# Patient Record
Sex: MALE | Race: WHITE | NOT HISPANIC OR LATINO | Employment: OTHER | URBAN - METROPOLITAN AREA
[De-identification: names, ages, dates, MRNs, and addresses within clinical notes are randomized per-mention and may not be internally consistent; named-entity substitution may affect disease eponyms.]

---

## 2018-08-17 RX ORDER — ACETAMINOPHEN 325 MG/1
650 TABLET ORAL EVERY 6 HOURS PRN
COMMUNITY

## 2018-08-17 RX ORDER — TAMSULOSIN HYDROCHLORIDE 0.4 MG/1
0.4 CAPSULE ORAL
COMMUNITY

## 2018-08-17 RX ORDER — ALPRAZOLAM 0.5 MG/1
TABLET ORAL
COMMUNITY

## 2018-08-17 RX ORDER — TRIAMCINOLONE ACETONIDE 55 UG/1
SPRAY, METERED NASAL DAILY
COMMUNITY
End: 2021-02-22 | Stop reason: HOSPADM

## 2018-08-17 RX ORDER — POTASSIUM CHLORIDE 20 MEQ/1
20 TABLET, EXTENDED RELEASE ORAL DAILY
COMMUNITY
End: 2021-02-22 | Stop reason: HOSPADM

## 2018-08-17 RX ORDER — MELATONIN
5000 DAILY
COMMUNITY

## 2018-08-17 RX ORDER — SERTRALINE HYDROCHLORIDE 25 MG/1
50 TABLET, FILM COATED ORAL DAILY
COMMUNITY

## 2018-08-17 RX ORDER — LATANOPROST 50 UG/ML
1 SOLUTION/ DROPS OPHTHALMIC
COMMUNITY
End: 2021-02-22 | Stop reason: HOSPADM

## 2018-08-17 RX ORDER — NITROGLYCERIN 0.4 MG/1
0.4 TABLET SUBLINGUAL AS NEEDED
COMMUNITY
End: 2021-02-22 | Stop reason: HOSPADM

## 2018-08-17 RX ORDER — FINASTERIDE 5 MG/1
5 TABLET, FILM COATED ORAL DAILY
COMMUNITY
End: 2021-02-22 | Stop reason: HOSPADM

## 2018-08-17 RX ORDER — PANTOPRAZOLE SODIUM 40 MG/1
40 TABLET, DELAYED RELEASE ORAL DAILY
COMMUNITY

## 2018-08-17 RX ORDER — SODIUM CHLORIDE 9 MG/ML
40 INJECTION, SOLUTION INTRAVENOUS CONTINUOUS
Status: CANCELLED | OUTPATIENT
Start: 2018-08-22 | End: 2018-08-23

## 2018-08-17 RX ORDER — LEVOTHYROXINE SODIUM 0.07 MG/1
75 TABLET ORAL DAILY
COMMUNITY

## 2018-08-17 RX ORDER — EPLERENONE 25 MG/1
25 TABLET, FILM COATED ORAL DAILY
COMMUNITY

## 2018-08-17 RX ORDER — METOPROLOL SUCCINATE 25 MG/1
25 TABLET, EXTENDED RELEASE ORAL
COMMUNITY

## 2018-08-17 RX ORDER — FUROSEMIDE 40 MG/1
40 TABLET ORAL DAILY
COMMUNITY

## 2018-08-17 RX ORDER — LIDOCAINE 50 MG/G
1 PATCH TOPICAL AS NEEDED
COMMUNITY

## 2018-08-17 RX ORDER — VALACYCLOVIR HYDROCHLORIDE 1 G/1
1000 TABLET, FILM COATED ORAL 3 TIMES DAILY
COMMUNITY
End: 2021-02-22 | Stop reason: HOSPADM

## 2018-08-17 RX ORDER — FERROUS SULFATE 325(65) MG
TABLET ORAL
COMMUNITY
End: 2018-08-31 | Stop reason: ALTCHOICE

## 2018-08-31 RX ORDER — ATORVASTATIN CALCIUM 10 MG/1
10 TABLET, FILM COATED ORAL
COMMUNITY

## 2018-08-31 RX ORDER — FERROUS SULFATE 325(65) MG
325 TABLET ORAL
COMMUNITY

## 2018-08-31 RX ORDER — CHLORAL HYDRATE 500 MG
1000 CAPSULE ORAL DAILY
COMMUNITY
End: 2021-02-22 | Stop reason: HOSPADM

## 2018-08-31 NOTE — PERIOPERATIVE NURSING NOTE
[facility]  Pt Christin bernabe is scheduling pt appointment  I notified [facility] of the  pt new resident, [employer] was contacted by [address] [place] [address] Ave PAT and [facility] about medications  Pt is on Relinquish and was  instruscted to hold 2 days prior to procedure date, per Dr Hernandez Rich

## 2018-09-05 ENCOUNTER — ANESTHESIA (OUTPATIENT)
Dept: PERIOP | Facility: HOSPITAL | Age: 83
End: 2018-09-05
Payer: COMMERCIAL

## 2018-09-05 ENCOUNTER — HOSPITAL ENCOUNTER (OUTPATIENT)
Facility: HOSPITAL | Age: 83
Setting detail: OUTPATIENT SURGERY
Discharge: HOME | End: 2018-09-05
Attending: INTERNAL MEDICINE | Admitting: INTERNAL MEDICINE
Payer: COMMERCIAL

## 2018-09-05 ENCOUNTER — ANESTHESIA EVENT (OUTPATIENT)
Dept: PERIOP | Facility: HOSPITAL | Age: 83
End: 2018-09-05
Payer: COMMERCIAL

## 2018-09-05 VITALS
TEMPERATURE: 98.8 F | HEART RATE: 68 BPM | HEIGHT: 67 IN | WEIGHT: 180 LBS | OXYGEN SATURATION: 100 % | RESPIRATION RATE: 20 BRPM | BODY MASS INDEX: 28.25 KG/M2 | SYSTOLIC BLOOD PRESSURE: 175 MMHG | DIASTOLIC BLOOD PRESSURE: 97 MMHG

## 2018-09-05 DIAGNOSIS — D50.0 IRON DEFICIENCY ANEMIA DUE TO CHRONIC BLOOD LOSS: ICD-10-CM

## 2018-09-05 PROCEDURE — 88305 TISSUE EXAM BY PATHOLOGIST: CPT | Performed by: PATHOLOGY

## 2018-09-05 RX ORDER — FENTANYL CITRATE/PF 50 MCG/ML
25 SYRINGE (ML) INJECTION
Status: DISCONTINUED | OUTPATIENT
Start: 2018-09-05 | End: 2018-09-05 | Stop reason: HOSPADM

## 2018-09-05 RX ORDER — SODIUM CHLORIDE 9 MG/ML
20 INJECTION, SOLUTION INTRAVENOUS CONTINUOUS
Status: DISCONTINUED | OUTPATIENT
Start: 2018-09-05 | End: 2018-09-05 | Stop reason: HOSPADM

## 2018-09-05 RX ORDER — METOCLOPRAMIDE HYDROCHLORIDE 5 MG/ML
10 INJECTION INTRAMUSCULAR; INTRAVENOUS ONCE AS NEEDED
Status: DISCONTINUED | OUTPATIENT
Start: 2018-09-05 | End: 2018-09-05 | Stop reason: HOSPADM

## 2018-09-05 RX ORDER — PROPOFOL 10 MG/ML
INJECTION, EMULSION INTRAVENOUS AS NEEDED
Status: DISCONTINUED | OUTPATIENT
Start: 2018-09-05 | End: 2018-09-05 | Stop reason: SURG

## 2018-09-05 RX ORDER — HYDRALAZINE HYDROCHLORIDE 20 MG/ML
5 INJECTION INTRAMUSCULAR; INTRAVENOUS AS NEEDED
Status: DISCONTINUED | OUTPATIENT
Start: 2018-09-05 | End: 2018-09-05 | Stop reason: HOSPADM

## 2018-09-05 RX ORDER — MEPERIDINE HYDROCHLORIDE 50 MG/ML
12.5 INJECTION INTRAMUSCULAR; INTRAVENOUS; SUBCUTANEOUS
Status: DISCONTINUED | OUTPATIENT
Start: 2018-09-05 | End: 2018-09-05 | Stop reason: HOSPADM

## 2018-09-05 RX ORDER — LABETALOL HYDROCHLORIDE 5 MG/ML
5 INJECTION, SOLUTION INTRAVENOUS AS NEEDED
Status: DISCONTINUED | OUTPATIENT
Start: 2018-09-05 | End: 2018-09-05 | Stop reason: HOSPADM

## 2018-09-05 RX ORDER — SODIUM CHLORIDE 9 MG/ML
125 INJECTION, SOLUTION INTRAVENOUS CONTINUOUS
Status: DISCONTINUED | OUTPATIENT
Start: 2018-09-05 | End: 2018-09-05 | Stop reason: HOSPADM

## 2018-09-05 RX ORDER — ONDANSETRON 2 MG/ML
4 INJECTION INTRAMUSCULAR; INTRAVENOUS ONCE AS NEEDED
Status: DISCONTINUED | OUTPATIENT
Start: 2018-09-05 | End: 2018-09-05 | Stop reason: HOSPADM

## 2018-09-05 RX ORDER — PROMETHAZINE HYDROCHLORIDE 25 MG/ML
6.25 INJECTION, SOLUTION INTRAMUSCULAR; INTRAVENOUS ONCE AS NEEDED
Status: DISCONTINUED | OUTPATIENT
Start: 2018-09-05 | End: 2018-09-05 | Stop reason: HOSPADM

## 2018-09-05 RX ADMIN — PROPOFOL 20 MG: 10 INJECTION, EMULSION INTRAVENOUS at 10:02

## 2018-09-05 RX ADMIN — PROPOFOL 50 MG: 10 INJECTION, EMULSION INTRAVENOUS at 09:54

## 2018-09-05 RX ADMIN — PROPOFOL 10 MG: 10 INJECTION, EMULSION INTRAVENOUS at 10:07

## 2018-09-05 RX ADMIN — PROPOFOL 10 MG: 10 INJECTION, EMULSION INTRAVENOUS at 10:12

## 2018-09-05 RX ADMIN — PROPOFOL 10 MG: 10 INJECTION, EMULSION INTRAVENOUS at 09:59

## 2018-09-05 RX ADMIN — SODIUM CHLORIDE: 0.9 INJECTION, SOLUTION INTRAVENOUS at 09:50

## 2018-09-05 NOTE — OP NOTE
**** GI/ENDOSCOPY REPORT ****     PATIENT NAME: Doris Mae ------ VISIT ID:  Patient ID:   [id number] YOB: 1927     INTRODUCTION: Colonoscopy - A 80 male patient presents for an outpatient   Colonoscopy at [facility]  PREVIOUS COLONOSCOPY: This colonoscopy is being performed for diagnostic   indication     INDICATIONS: Iron deficiency anemia  CONSENT:  The benefits, risks, and alternatives to the procedure were   discussed and informed consent was obtained from the patient  PREPARATION: EKG, pulse, pulse oximetry and blood pressure were monitored   throughout the procedure  The patient was identified by myself both   verbally and by visual inspection of ID band  Airway Assessment   Classification: Airway class 2 - Visualization of the soft palate, fauces   and uvula  ASA Classification: See anesthesia record  MEDICATIONS: Anesthesia-check records     PROCEDURE:  The endoscope was passed without difficulty through the anus   under direct visualization and advanced to the cecum, confirmed by   appendiceal orifice and ileocecal valve  The scope was withdrawn and the   mucosa was carefully examined  The quality of the preparation was  Cecal   Intubation Time: 3 minutes(s) Scope Withdrawal Time: 11 minutes(s)     RECTAL EXAM: Normal rectal exam      FINDINGS:  A single sessile polyp, measuring 8 mm in size, was found in   the proximal ascending colon  The polyp was removed by cold snare   polypectomy  The polyp was retrieved  A single sessile polyp, measuring 4   mm in size, was found in the rectum  There was evidence of moderately   severe diverticulosis in the sigmoid colon  Medium-sized grade III   hemorrhoids were found  COMPLICATIONS: There were no complications  IMPRESSIONS: A single sessile polyp found in the proximal ascending colon;   removed by cold snare polypectomy  A single sessile polyp found in the   rectum   Moderately severe diverticulosis found in the sigmoid colon  Grade   III hemorrhoids  RECOMMENDATIONS: Colonoscopy recommended in as needed per clinical   indication in the future  Resume Eliquis 9/7/18  Resume regular diet as   tolerated  Continue current medications  Follow-up appointment with   endoscopist in 1 month  ESTIMATED BLOOD LOSS:     PATHOLOGY SPECIMENS: Removed by cold snare polypectomy  Yes     PROCEDURE CODES: : Colonoscopy with snare polypectomy     ICD-9 Codes: 280 9 Iron deficiency anemia, unspecified 211 3 Benign   neoplasm of colon 211 4 Benign neoplasm of rectum and anal canal 562 10   Diverticulosis of colon (without mention of hemorrhage)     ICD-10 Codes: D50 9 Iron deficiency anemia, unspecified K63 5 Polyp of   colon K63 5 Polyp of colon K57 Diverticular disease of intestine K64 2   Third degree hemorrhoids     PERFORMED BY: GUY Jimenez  on 09/05/2018  The procedure was   performed by Dr Radha Nichols  Version 1, electronically signed by GUY Aguirre  on 09/05/2018   at 10:37

## 2018-09-05 NOTE — ANESTHESIA PREPROCEDURE EVALUATION
Review of Systems/Medical History  Patient summary reviewed  Chart reviewed  No history of anesthetic complications     Cardiovascular  Exercise tolerance (METS): <4,  Hyperlipidemia, Hypertension , Valvular heart disease , aortic valve stenosis, CAD , History of CABG, Dysrhythmias , atrial fibrillation,    Pulmonary  Smoker ex-smoker  ,        GI/Hepatic  Negative GI/hepatic ROS          Prostatic disorder, history of prostate cancer       Endo/Other  History of thyroid disease , hypothyroidism,      GYN  Negative gynecology ROS          Hematology  Anemia iron deficiency anemia,     Musculoskeletal    Arthritis     Neurology  Negative neurology ROS      Psychology   Negative psychology ROS              Physical Exam    Airway    Mallampati score: II  TM Distance: >3 FB  Neck ROM: full     Dental       Cardiovascular  Rhythm: regular, Rate: normal, Murmur, Cardiovascular exam normal    Pulmonary  Pulmonary exam normal Breath sounds clear to auscultation,     Other Findings        Anesthesia Plan  ASA Score- 3     Anesthesia Type- IV sedation with anesthesia with ASA Monitors  Additional Monitors:   Airway Plan:         Plan Factors-    Induction-     Postoperative Plan-     Informed Consent- Anesthetic plan and risks discussed with patient

## 2018-09-05 NOTE — OP NOTE
**** GI/ENDOSCOPY REPORT ****     PATIENT NAME: Mohan Mckenzie - VISIT ID:  Patient ID: [id number]   YOB: 1927     INTRODUCTION: Esophagogastroduodenoscopy - A 80 male patient presents for   an outpatient Esophag[facility]astroduodenoscopy at [facility]  INDICATIONS: Anemia  CONSENT: The benefits, risks, and alternatives to the procedure were   discussed and informed consent was obtained from the patient  PREPARATION:  EKG, pulse, pulse oximetry and blood pressure were monitored   throughout the procedure  MEDICATIONS:     PROCEDURE:  The endoscope was passed without difficulty through the mouth   under direct visualization and advanced to the 2nd portion of the   duodenum  The scope was withdrawn and the mucosa was carefully examined  FINDINGS:   Esophagus: Diverticulum of mid esophagus  Stomach: The   stomach appeared to be normal   Duodenum: The duodenum appeared to be   normal      COMPLICATIONS: There were no complications  IMPRESSIONS: Diverticulum of mid esophagus  Normal stomach  Normal   duodenum  RECOMMENDATIONS: No bleeding source on EGD  Proceed with colonoscopy  ESTIMATED BLOOD LOSS: None  PATHOLOGY SPECIMENS: No     PROCEDURE CODES: 43385 - EGD flexible; incl brushing or washing     ICD-9 Codes:     ICD-10 Codes: D64 9 Anemia, unspecified     PERFORMED BY: GUY Coles  on 09/05/2018  The procedure was   performed by Dr Miller Breeding  Version 1, electronically signed by GUY Singleton  on 09/05/2018   at 10:32

## 2018-09-05 NOTE — ANESTHESIA POSTPROCEDURE EVALUATION
Post-Op Assessment Note      CV Status:  Stable    Mental Status:  Alert and awake    Hydration Status:  Stable    PONV Controlled:  None    Airway Patency:  Patent and adequate    Post Op Vitals Reviewed: Yes          Staff: Anesthesiologist           BP      Temp     Pulse     Resp      SpO2

## 2020-01-24 ENCOUNTER — HOSPITAL ENCOUNTER (OUTPATIENT)
Dept: RADIOLOGY | Facility: HOSPITAL | Age: 85
Discharge: HOME | End: 2020-01-24
Attending: FAMILY MEDICINE
Payer: COMMERCIAL

## 2020-01-24 ENCOUNTER — TRANSCRIBE ORDERS (OUTPATIENT)
Dept: ADMINISTRATIVE | Facility: HOSPITAL | Age: 85
End: 2020-01-24

## 2020-01-24 DIAGNOSIS — S99.922A INJURY OF TOE ON LEFT FOOT, INITIAL ENCOUNTER: Primary | ICD-10-CM

## 2020-01-24 DIAGNOSIS — S99.922A INJURY OF TOE ON LEFT FOOT, INITIAL ENCOUNTER: ICD-10-CM

## 2020-01-24 PROCEDURE — 73630 X-RAY EXAM OF FOOT: CPT

## 2021-01-08 LAB — EXT SARS-COV-2: DETECTED

## 2021-01-18 ENCOUNTER — TELEPHONE (OUTPATIENT)
Dept: FAMILY MEDICINE CLINIC | Facility: HOSPITAL | Age: 86
End: 2021-01-18

## 2021-02-20 ENCOUNTER — APPOINTMENT (EMERGENCY)
Dept: CT IMAGING | Facility: HOSPITAL | Age: 86
End: 2021-02-20
Payer: COMMERCIAL

## 2021-02-20 ENCOUNTER — HOSPITAL ENCOUNTER (INPATIENT)
Facility: HOSPITAL | Age: 86
LOS: 2 days | Discharge: HOME HEALTH | DRG: 552 | End: 2021-02-22
Attending: SURGERY | Admitting: SURGERY
Payer: COMMERCIAL

## 2021-02-20 ENCOUNTER — HOSPITAL ENCOUNTER (EMERGENCY)
Facility: HOSPITAL | Age: 86
End: 2021-02-20
Attending: EMERGENCY MEDICINE | Admitting: EMERGENCY MEDICINE
Payer: COMMERCIAL

## 2021-02-20 ENCOUNTER — APPOINTMENT (EMERGENCY)
Dept: RADIOLOGY | Facility: HOSPITAL | Age: 86
End: 2021-02-20
Payer: COMMERCIAL

## 2021-02-20 VITALS
DIASTOLIC BLOOD PRESSURE: 63 MMHG | SYSTOLIC BLOOD PRESSURE: 108 MMHG | TEMPERATURE: 97.1 F | RESPIRATION RATE: 18 BRPM | HEIGHT: 67 IN | HEART RATE: 73 BPM | WEIGHT: 180 LBS | BODY MASS INDEX: 28.25 KG/M2 | OXYGEN SATURATION: 97 %

## 2021-02-20 DIAGNOSIS — S22.040A COMPRESSION FRACTURE OF T4 VERTEBRA (HCC): ICD-10-CM

## 2021-02-20 DIAGNOSIS — S22.029A CLOSED T2 FRACTURE (HCC): ICD-10-CM

## 2021-02-20 DIAGNOSIS — S09.90XA HEAD INJURY: ICD-10-CM

## 2021-02-20 DIAGNOSIS — S22.020A: ICD-10-CM

## 2021-02-20 DIAGNOSIS — S22.028A OTHER CLOSED FRACTURE OF SECOND THORACIC VERTEBRA, INITIAL ENCOUNTER (HCC): Primary | ICD-10-CM

## 2021-02-20 DIAGNOSIS — G96.08 SUBDURAL HYGROMA: ICD-10-CM

## 2021-02-20 DIAGNOSIS — S00.01XA ABRASION OF SCALP: ICD-10-CM

## 2021-02-20 DIAGNOSIS — S16.1XXA CERVICAL STRAIN, ACUTE: ICD-10-CM
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
## 2021-02-20 PROBLEM — M40.03 POSTURAL KYPHOSIS OF CERVICOTHORACIC REGION: Status: ACTIVE | Noted: 2021-02-20

## 2021-02-20 LAB
ANION GAP SERPL CALCULATED.3IONS-SCNC: 3 MMOL/L (ref 4–13)
ANION GAP SERPL CALCULATED.3IONS-SCNC: 4 MMOL/L (ref 4–13)
APTT PPP: 32 SECONDS (ref 23–37)
ATRIAL RATE: 89 BPM
BASOPHILS # BLD AUTO: 0.02 THOUSANDS/ΜL (ref 0–0.1)
BASOPHILS NFR BLD AUTO: 0 % (ref 0–1)
BUN SERPL-MCNC: 28 MG/DL (ref 5–25)
BUN SERPL-MCNC: 28 MG/DL (ref 5–25)
CALCIUM SERPL-MCNC: 8 MG/DL (ref 8.3–10.1)
CALCIUM SERPL-MCNC: 8.1 MG/DL (ref 8.3–10.1)
CHLORIDE SERPL-SCNC: 106 MMOL/L (ref 100–108)
CHLORIDE SERPL-SCNC: 107 MMOL/L (ref 100–108)
CO2 SERPL-SCNC: 32 MMOL/L (ref 21–32)
CO2 SERPL-SCNC: 34 MMOL/L (ref 21–32)
CREAT SERPL-MCNC: 1.24 MG/DL (ref 0.6–1.3)
CREAT SERPL-MCNC: 1.46 MG/DL (ref 0.6–1.3)
EOSINOPHIL # BLD AUTO: 0.03 THOUSAND/ΜL (ref 0–0.61)
EOSINOPHIL NFR BLD AUTO: 1 % (ref 0–6)
ERYTHROCYTE [DISTWIDTH] IN BLOOD BY AUTOMATED COUNT: 15.5 % (ref 11.6–15.1)
GFR SERPL CREATININE-BSD FRML MDRD: 41 ML/MIN/1.73SQ M
GFR SERPL CREATININE-BSD FRML MDRD: 50 ML/MIN/1.73SQ M
GLUCOSE SERPL-MCNC: 102 MG/DL (ref 65–140)
GLUCOSE SERPL-MCNC: 103 MG/DL (ref 65–140)
HCT VFR BLD AUTO: 37.7 % (ref 36.5–49.3)
HGB BLD-MCNC: 12.1 G/DL (ref 12–17)
IMM GRANULOCYTES # BLD AUTO: 0.01 THOUSAND/UL (ref 0–0.2)
IMM GRANULOCYTES NFR BLD AUTO: 0 % (ref 0–2)
INR PPP: 1.05 (ref 0.84–1.19)
LYMPHOCYTES # BLD AUTO: 1.09 THOUSANDS/ΜL (ref 0.6–4.47)
LYMPHOCYTES NFR BLD AUTO: 21 % (ref 14–44)
MAGNESIUM SERPL-MCNC: 2.3 MG/DL (ref 1.6–2.6)
MCH RBC QN AUTO: 34 PG (ref 26.8–34.3)
MCHC RBC AUTO-ENTMCNC: 32.1 G/DL (ref 31.4–37.4)
MCV RBC AUTO: 106 FL (ref 82–98)
MONOCYTES # BLD AUTO: 0.23 THOUSAND/ΜL (ref 0.17–1.22)
MONOCYTES NFR BLD AUTO: 5 % (ref 4–12)
NEUTROPHILS # BLD AUTO: 3.75 THOUSANDS/ΜL (ref 1.85–7.62)
NEUTS SEG NFR BLD AUTO: 73 % (ref 43–75)
NRBC BLD AUTO-RTO: 0 /100 WBCS
PHOSPHATE SERPL-MCNC: 3.3 MG/DL (ref 2.3–4.1)
PLATELET # BLD AUTO: 59 THOUSANDS/UL (ref 149–390)
PMV BLD AUTO: 11.4 FL (ref 8.9–12.7)
POTASSIUM SERPL-SCNC: 3.8 MMOL/L (ref 3.5–5.3)
POTASSIUM SERPL-SCNC: 4.7 MMOL/L (ref 3.5–5.3)
PROTHROMBIN TIME: 13.8 SECONDS (ref 11.6–14.5)
QRS AXIS: 34 DEGREES
QRSD INTERVAL: 90 MS
QT INTERVAL: 402 MS
QTC INTERVAL: 440 MS
RBC # BLD AUTO: 3.56 MILLION/UL (ref 3.88–5.62)
SODIUM SERPL-SCNC: 143 MMOL/L (ref 136–145)
SODIUM SERPL-SCNC: 143 MMOL/L (ref 136–145)
T WAVE AXIS: -24 DEGREES
TROPONIN I SERPL-MCNC: <0.02 NG/ML
VENTRICULAR RATE: 72 BPM
WBC # BLD AUTO: 5.13 THOUSAND/UL (ref 4.31–10.16)

## 2021-02-20 PROCEDURE — 80048 BASIC METABOLIC PNL TOTAL CA: CPT | Performed by: PHYSICIAN ASSISTANT

## 2021-02-20 PROCEDURE — 93005 ELECTROCARDIOGRAM TRACING: CPT

## 2021-02-20 PROCEDURE — 70450 CT HEAD/BRAIN W/O DYE: CPT

## 2021-02-20 PROCEDURE — 72125 CT NECK SPINE W/O DYE: CPT

## 2021-02-20 PROCEDURE — 99223 1ST HOSP IP/OBS HIGH 75: CPT | Performed by: SURGERY

## 2021-02-20 PROCEDURE — 85025 COMPLETE CBC W/AUTO DIFF WBC: CPT | Performed by: PHYSICIAN ASSISTANT

## 2021-02-20 PROCEDURE — 99223 1ST HOSP IP/OBS HIGH 75: CPT | Performed by: PHYSICIAN ASSISTANT

## 2021-02-20 PROCEDURE — 84100 ASSAY OF PHOSPHORUS: CPT | Performed by: SURGERY

## 2021-02-20 PROCEDURE — 84484 ASSAY OF TROPONIN QUANT: CPT | Performed by: PHYSICIAN ASSISTANT

## 2021-02-20 PROCEDURE — 71045 X-RAY EXAM CHEST 1 VIEW: CPT

## 2021-02-20 PROCEDURE — 36415 COLL VENOUS BLD VENIPUNCTURE: CPT | Performed by: PHYSICIAN ASSISTANT

## 2021-02-20 PROCEDURE — 83735 ASSAY OF MAGNESIUM: CPT | Performed by: SURGERY

## 2021-02-20 PROCEDURE — 85610 PROTHROMBIN TIME: CPT | Performed by: PHYSICIAN ASSISTANT

## 2021-02-20 PROCEDURE — 85730 THROMBOPLASTIN TIME PARTIAL: CPT | Performed by: PHYSICIAN ASSISTANT

## 2021-02-20 PROCEDURE — 93010 ELECTROCARDIOGRAM REPORT: CPT | Performed by: INTERNAL MEDICINE

## 2021-02-20 PROCEDURE — 99285 EMERGENCY DEPT VISIT HI MDM: CPT

## 2021-02-20 PROCEDURE — 80048 BASIC METABOLIC PNL TOTAL CA: CPT | Performed by: SURGERY

## 2021-02-20 RX ORDER — LEVOTHYROXINE SODIUM 0.07 MG/1
75 TABLET ORAL
Status: DISCONTINUED | OUTPATIENT
Start: 2021-02-21 | End: 2021-02-22 | Stop reason: HOSPADM

## 2021-02-20 RX ORDER — POTASSIUM CHLORIDE 20 MEQ/1
20 TABLET, EXTENDED RELEASE ORAL DAILY
Status: DISCONTINUED | OUTPATIENT
Start: 2021-02-21 | End: 2021-02-21

## 2021-02-20 RX ORDER — OXYCODONE HYDROCHLORIDE 5 MG/1
5 TABLET ORAL EVERY 4 HOURS PRN
Status: DISCONTINUED | OUTPATIENT
Start: 2021-02-20 | End: 2021-02-22 | Stop reason: HOSPADM

## 2021-02-20 RX ORDER — SODIUM CHLORIDE, SODIUM GLUCONATE, SODIUM ACETATE, POTASSIUM CHLORIDE, MAGNESIUM CHLORIDE, SODIUM PHOSPHATE, DIBASIC, AND POTASSIUM PHOSPHATE .53; .5; .37; .037; .03; .012; .00082 G/100ML; G/100ML; G/100ML; G/100ML; G/100ML; G/100ML; G/100ML
75 INJECTION, SOLUTION INTRAVENOUS CONTINUOUS
Status: DISCONTINUED | OUTPATIENT
Start: 2021-02-20 | End: 2021-02-21

## 2021-02-20 RX ORDER — ACETAMINOPHEN 325 MG/1
650 TABLET ORAL ONCE
Status: COMPLETED | OUTPATIENT
Start: 2021-02-20 | End: 2021-02-20

## 2021-02-20 RX ORDER — TAMSULOSIN HYDROCHLORIDE 0.4 MG/1
0.4 CAPSULE ORAL
Status: DISCONTINUED | OUTPATIENT
Start: 2021-02-20 | End: 2021-02-22 | Stop reason: HOSPADM

## 2021-02-20 RX ORDER — FINASTERIDE 5 MG/1
5 TABLET, FILM COATED ORAL DAILY
Status: DISCONTINUED | OUTPATIENT
Start: 2021-02-21 | End: 2021-02-21

## 2021-02-20 RX ORDER — GABAPENTIN 100 MG/1
100 CAPSULE ORAL 3 TIMES DAILY
Status: DISCONTINUED | OUTPATIENT
Start: 2021-02-20 | End: 2021-02-21

## 2021-02-20 RX ORDER — ACETAMINOPHEN 325 MG/1
650 TABLET ORAL EVERY 6 HOURS SCHEDULED
Status: DISCONTINUED | OUTPATIENT
Start: 2021-02-20 | End: 2021-02-21

## 2021-02-20 RX ORDER — METOPROLOL SUCCINATE 25 MG/1
25 TABLET, EXTENDED RELEASE ORAL
Status: DISCONTINUED | OUTPATIENT
Start: 2021-02-20 | End: 2021-02-22 | Stop reason: HOSPADM

## 2021-02-20 RX ORDER — FUROSEMIDE 40 MG/1
40 TABLET ORAL DAILY
Status: DISCONTINUED | OUTPATIENT
Start: 2021-02-21 | End: 2021-02-22 | Stop reason: HOSPADM

## 2021-02-20 RX ORDER — HYDROMORPHONE HCL/PF 1 MG/ML
0.2 SYRINGE (ML) INJECTION
Status: DISCONTINUED | OUTPATIENT
Start: 2021-02-20 | End: 2021-02-21

## 2021-02-20 RX ORDER — ONDANSETRON 2 MG/ML
4 INJECTION INTRAMUSCULAR; INTRAVENOUS EVERY 6 HOURS PRN
Status: DISCONTINUED | OUTPATIENT
Start: 2021-02-20 | End: 2021-02-21

## 2021-02-20 RX ORDER — OXYCODONE HYDROCHLORIDE 5 MG/1
2.5 TABLET ORAL EVERY 4 HOURS PRN
Status: DISCONTINUED | OUTPATIENT
Start: 2021-02-20 | End: 2021-02-22 | Stop reason: HOSPADM

## 2021-02-20 RX ORDER — ATORVASTATIN CALCIUM 10 MG/1
10 TABLET, FILM COATED ORAL
Status: DISCONTINUED | OUTPATIENT
Start: 2021-02-20 | End: 2021-02-22 | Stop reason: HOSPADM

## 2021-02-20 RX ORDER — ALPRAZOLAM 0.25 MG/1
0.25 TABLET ORAL
Status: DISCONTINUED | OUTPATIENT
Start: 2021-02-20 | End: 2021-02-21

## 2021-02-20 RX ORDER — LIDOCAINE 50 MG/G
2 PATCH TOPICAL DAILY
Status: DISCONTINUED | OUTPATIENT
Start: 2021-02-20 | End: 2021-02-22 | Stop reason: HOSPADM

## 2021-02-20 RX ORDER — PANTOPRAZOLE SODIUM 40 MG/1
40 TABLET, DELAYED RELEASE ORAL DAILY
Status: DISCONTINUED | OUTPATIENT
Start: 2021-02-21 | End: 2021-02-22 | Stop reason: HOSPADM

## 2021-02-20 RX ORDER — EPLERENONE 25 MG/1
25 TABLET, FILM COATED ORAL DAILY
Status: DISCONTINUED | OUTPATIENT
Start: 2021-02-21 | End: 2021-02-22 | Stop reason: HOSPADM

## 2021-02-20 RX ADMIN — TAMSULOSIN HYDROCHLORIDE 0.4 MG: 0.4 CAPSULE ORAL at 18:24

## 2021-02-20 RX ADMIN — GABAPENTIN 100 MG: 100 CAPSULE ORAL at 21:11

## 2021-02-20 RX ADMIN — ACETAMINOPHEN 650 MG: 325 TABLET ORAL at 18:24

## 2021-02-20 RX ADMIN — GABAPENTIN 100 MG: 100 CAPSULE ORAL at 18:24

## 2021-02-20 RX ADMIN — ATORVASTATIN CALCIUM 10 MG: 10 TABLET, FILM COATED ORAL at 21:11

## 2021-02-20 RX ADMIN — LIDOCAINE 2 PATCH: 50 PATCH TOPICAL at 18:24

## 2021-02-20 RX ADMIN — ACETAMINOPHEN 650 MG: 325 TABLET, FILM COATED ORAL at 13:20

## 2021-02-20 RX ADMIN — SODIUM CHLORIDE, SODIUM GLUCONATE, SODIUM ACETATE, POTASSIUM CHLORIDE, MAGNESIUM CHLORIDE, SODIUM PHOSPHATE, DIBASIC, AND POTASSIUM PHOSPHATE 75 ML/HR: .53; .5; .37; .037; .03; .012; .00082 INJECTION, SOLUTION INTRAVENOUS at 18:00

## 2021-02-20 NOTE — H&P
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  -Chronic T1& T3 vertebral fx  Trauma Plan:   Trauma surgery admission  DNR-DNI confirmed with pt's JHOANA Rey [facility]- [phone]  Neurosurgery consult  Place on tele for Afib  Hold eliquis  Resume home meds and geriatric consult to avoid polypharmacy  KAMRON: Cr: 1 4 up from baseline 1 1  started isolyte @ 75cc/h for 24h  PT/OT/ CM  Multimodal pain control    Chief Complaint: R sided neck pain  Back pain    History of Present Illness   HPI:  Corrie Hawkins is a 80 y o  male w PMH of CAD, Afib on eliquis, kyphosis, prior CABG, who presents after a fall from standing on 2/17  Fall was complicated with acute T2 fracture, also with change in mental status  Patient has chronic bilateral subdural hygromas left worse than the right  Also with an age indeterminate compression fracture T4 with central stenosis  Also chronic T1 and T3 vertebral fractures  Patient is sensory motor intact to his lower extremities  Noted T-spine tenderness on exam with significant kyphosis  GCS 14, confusion  Denied any recent fevers, chills, chest pain shortness of breath today  Mechanism:Fall    Review of Systems   Constitutional: Negative for chills and fever  HENT: Negative  Eyes: Negative  Respiratory: Negative  Cardiovascular: Negative  Gastrointestinal: Negative  Endocrine: Negative  Genitourinary: Negative  Musculoskeletal: Negative  Skin: Negative  Allergic/Immunologic: Negative  Neurological: Negative  Hematological: Negative  Psychiatric/Behavioral: Negative  12-point, complete review of systems was reviewed and negative except as stated above  Historical Information   History is unobtainable from the patient due to none  Efforts to obtain history included the following sources: family member    Past Medical History:   Diagnosis Date    Anemia     Arthritis     Atrial fibrillation ([facility] )     CAD (coronary artery disease)     Cancer ([facility] )     prostate    Diverticulosis     Kyphosis     UTI (urinary tract infection)      Past Surgical History:   Procedure Laterality Date    ADENOIDECTOMY      CORONARY ARTERY BYPASS GRAFT      INGUINAL HERNIA REPAIR      PA ESOPHAGOGASTRODUODENOSCOPY TRANSORAL DIAGNOSTIC N/A 9/5/2018    Procedure: EGD AND COLONOSCOPY;  Surgeon: Donivan Goldmann, DO;  Location: [facility] OR;  Service: Gastroenterology    TONSILLECTOMY      TRANSURETHRAL RESECTION OF PROSTATE       Social History   Social History     Substance and Sexual Activity   Alcohol Use None     Social History     Substance and Sexual Activity   Drug Use Not on file     Social History     Tobacco Use   Smoking Status Former Smoker   Smokeless Tobacco Never Used     E-Cigarette/Vaping     E-Cigarette/Vaping Substances       There is no immunization history on file for this patient  Last Tetanus: unknown  Family History: Non-contributory  Unable to obtain/limited by none  Meds/Allergies   all current active meds have been reviewed    Allergies   Allergen Reactions    Other Rash     Adhesive tape         PHYSICAL EXAM    PE limited by: none  Objective   Vitals:   First set: Temperature: (!) 97 2 °F (36 2 °C) (02/20/21 1515)  Pulse: 78 (02/20/21 1515)  Respirations: 18 (02/20/21 1515)  Blood Pressure: 129/71 (02/20/21 1515)    Primary Survey:   (A) Airway: intact  (B) Breathing: intact     (C) Circulation: Pulses:   normal  (D) Disabliity:  GCS Total:  15  (E) Expose:  Completed    Secondary Survey: (Click on Physical Exam tab above)  Physical Exam  Vitals signs reviewed  HENT:      Head: Normocephalic and atraumatic  Nose: Nose normal       Mouth/Throat:      Mouth: Mucous membranes are moist    Eyes:      General: No scleral icterus  Pupils: Pupils are equal, round, and reactive to light  Neck:      Musculoskeletal: Normal range of motion and neck supple  Cardiovascular:      Rate and Rhythm: Normal rate  Pulses: Normal pulses  Pulmonary:      Effort: Pulmonary effort is normal    Abdominal:      General: There is no distension  Palpations: Abdomen is soft  Tenderness: There is no abdominal tenderness  Genitourinary:     Comments: deferred  Musculoskeletal: Normal range of motion  Skin:     General: Skin is warm  Capillary Refill: Capillary refill takes 2 to 3 seconds  Neurological:      General: No focal deficit present  Mental Status: He is alert  Psychiatric:         Mood and Affect: Mood normal          Invasive Devices     Peripheral Intravenous Line            Peripheral IV 02/20/21 Left Antecubital less than 1 day                Lab Results:   Results: I have personally reviewed pertinent reports   , BMP/CMP:   Lab Results   Component Value Date    SODIUM 143 02/20/2021    K 3 8 02/20/2021     02/20/2021    CO2 34 (H) 02/20/2021    BUN 28 (H) 02/20/2021    CREATININE 1 46 (H) 02/20/2021    CALCIUM 8 0 (L) 02/20/2021    EGFR 41 02/20/2021   , CBC:   Lab Results   Component Value Date    WBC 5 13 02/20/2021    HGB 12 1 02/20/2021    HCT 37 7 02/20/2021     (H) 02/20/2021    PLT 59 (L) 02/20/2021    MCH 34 0 02/20/2021    MCHC 32 1 02/20/2021    RDW 15 5 (H) 02/20/2021    MPV 11 4 02/20/2021    NRBC 0 02/20/2021    and Coagulation:   Lab Results   Component Value Date    INR 1 05 02/20/2021     Imaging/EKG Studies: Results: I have personally reviewed pertinent reports      Other Studies:   2/20 CTH: Limited examination due to patient positioning  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  No acute intracranial abnormality  Chronic appearing bilateral subdural hygromas, left side larger than right   2/20 CTcspine: Somewhat limited examination due to patient positioning and body habitus  Severe, age-indeterminate compression fracture involving both the superior and inferior endplates of T4 with near vertebral plana  There is mild bony retropulsion, resulting in mild central stenosis  Mild compression fracture superior endplate T2 which appears acute  Mild chronic appearing compression fractures involving the superior endplates of T1 and T3 vertebral bodies  Moderate degenerative changes of the cervical spine  No cervical spine fracture or traumatic malalignment  2/20 CXR: Right midlung linear atelectasis      Code Status: No Order  Advance Directive and Living Will:      Power of :    POLST:

## 2021-02-20 NOTE — ED NOTES
Pt has Pureed diet and thickened liquids ordered at [employer], pt was given Tylenol in pudding and able to swallow without coughing        Mikala Hogan RN  02/20/21 5084
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
## 2021-02-20 NOTE — QUICK NOTE
Trauma  Quick note    Confirmed pt's dnr-dni status with family  Explained pt's care plan  Pt's POA is Reji Kilpatrick  Cell: [phone]       Bridgette Rebolledo MD  4:05PM  2/20/21

## 2021-02-20 NOTE — TRAUMA DOCUMENTATION
ROM p/u 1345, [facility]; Dr Sanders accepting; [phone] for RN to RN report

## 2021-02-21 ENCOUNTER — APPOINTMENT (INPATIENT)
Dept: RADIOLOGY | Facility: HOSPITAL | Age: 86
DRG: 552 | End: 2021-02-21
Payer: COMMERCIAL

## 2021-02-21 PROBLEM — S31.000A SACRAL WOUND: Status: ACTIVE | Noted: 2021-02-21

## 2021-02-21 PROBLEM — N17.9 ACUTE KIDNEY INJURY (HCC): Status: ACTIVE | Noted: 2021-02-21

## 2021-02-21 PROBLEM — R13.10 DYSPHAGIA: Status: ACTIVE | Noted: 2021-02-21

## 2021-02-21 PROBLEM — D64.9 ANEMIA: Status: ACTIVE | Noted: 2021-02-21
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
## 2021-02-21 PROBLEM — I48.91 ATRIAL FIBRILLATION (HCC): Status: ACTIVE | Noted: 2021-02-21

## 2021-02-21 LAB
ANION GAP SERPL CALCULATED.3IONS-SCNC: 6 MMOL/L (ref 4–13)
BUN SERPL-MCNC: 29 MG/DL (ref 5–25)
CALCIUM SERPL-MCNC: 8.3 MG/DL (ref 8.3–10.1)
CHLORIDE SERPL-SCNC: 108 MMOL/L (ref 100–108)
CO2 SERPL-SCNC: 32 MMOL/L (ref 21–32)
CREAT SERPL-MCNC: 1.18 MG/DL (ref 0.6–1.3)
ERYTHROCYTE [DISTWIDTH] IN BLOOD BY AUTOMATED COUNT: 15.6 % (ref 11.6–15.1)
GFR SERPL CREATININE-BSD FRML MDRD: 53 ML/MIN/1.73SQ M
GLUCOSE SERPL-MCNC: 131 MG/DL (ref 65–140)
GLUCOSE SERPL-MCNC: 85 MG/DL (ref 65–140)
HCT VFR BLD AUTO: 33.8 % (ref 36.5–49.3)
HGB BLD-MCNC: 10.7 G/DL (ref 12–17)
INR PPP: 1 (ref 0.84–1.19)
MCH RBC QN AUTO: 33.6 PG (ref 26.8–34.3)
MCHC RBC AUTO-ENTMCNC: 31.7 G/DL (ref 31.4–37.4)
MCV RBC AUTO: 106 FL (ref 82–98)
PLATELET # BLD AUTO: 53 THOUSANDS/UL (ref 149–390)
PMV BLD AUTO: 11 FL (ref 8.9–12.7)
POTASSIUM SERPL-SCNC: 3.9 MMOL/L (ref 3.5–5.3)
PROTHROMBIN TIME: 13.2 SECONDS (ref 11.6–14.5)
RBC # BLD AUTO: 3.18 MILLION/UL (ref 3.88–5.62)
SODIUM SERPL-SCNC: 146 MMOL/L (ref 136–145)
WBC # BLD AUTO: 3.69 THOUSAND/UL (ref 4.31–10.16)

## 2021-02-21 PROCEDURE — NC001 PR NO CHARGE: Performed by: SURGERY

## 2021-02-21 PROCEDURE — 99223 1ST HOSP IP/OBS HIGH 75: CPT | Performed by: NURSE PRACTITIONER

## 2021-02-21 PROCEDURE — 85610 PROTHROMBIN TIME: CPT | Performed by: SURGERY

## 2021-02-21 PROCEDURE — 72072 X-RAY EXAM THORAC SPINE 3VWS: CPT

## 2021-02-21 PROCEDURE — 99232 SBSQ HOSP IP/OBS MODERATE 35: CPT | Performed by: SURGERY

## 2021-02-21 PROCEDURE — 85027 COMPLETE CBC AUTOMATED: CPT | Performed by: SURGERY

## 2021-02-21 PROCEDURE — 97167 OT EVAL HIGH COMPLEX 60 MIN: CPT

## 2021-02-21 PROCEDURE — 92610 EVALUATE SWALLOWING FUNCTION: CPT

## 2021-02-21 PROCEDURE — 82948 REAGENT STRIP/BLOOD GLUCOSE: CPT

## 2021-02-21 PROCEDURE — 97163 PT EVAL HIGH COMPLEX 45 MIN: CPT

## 2021-02-21 PROCEDURE — 80048 BASIC METABOLIC PNL TOTAL CA: CPT | Performed by: PHYSICIAN ASSISTANT

## 2021-02-21 RX ORDER — SERTRALINE HYDROCHLORIDE 25 MG/1
25 TABLET, FILM COATED ORAL DAILY
Status: DISCONTINUED | OUTPATIENT
Start: 2021-02-22 | End: 2021-02-22 | Stop reason: HOSPADM

## 2021-02-21 RX ORDER — HEPARIN SODIUM 5000 [USP'U]/ML
5000 INJECTION, SOLUTION INTRAVENOUS; SUBCUTANEOUS EVERY 8 HOURS SCHEDULED
Status: DISCONTINUED | OUTPATIENT
Start: 2021-02-21 | End: 2021-02-22 | Stop reason: HOSPADM

## 2021-02-21 RX ORDER — FERROUS SULFATE 325(65) MG
325 TABLET ORAL
Status: DISCONTINUED | OUTPATIENT
Start: 2021-02-22 | End: 2021-02-22 | Stop reason: HOSPADM

## 2021-02-21 RX ORDER — GABAPENTIN 100 MG/1
100 CAPSULE ORAL
Status: DISCONTINUED | OUTPATIENT
Start: 2021-02-21 | End: 2021-02-22 | Stop reason: HOSPADM

## 2021-02-21 RX ORDER — MELATONIN
1000 DAILY
Status: DISCONTINUED | OUTPATIENT
Start: 2021-02-21 | End: 2021-02-22 | Stop reason: HOSPADM

## 2021-02-21 RX ORDER — ALPRAZOLAM 0.5 MG/1
0.5 TABLET ORAL
Status: DISCONTINUED | OUTPATIENT
Start: 2021-02-21 | End: 2021-02-22 | Stop reason: HOSPADM

## 2021-02-21 RX ORDER — POTASSIUM CHLORIDE 20MEQ/15ML
30 LIQUID (ML) ORAL ONCE
Status: COMPLETED | OUTPATIENT
Start: 2021-02-21 | End: 2021-02-21

## 2021-02-21 RX ORDER — ACETAMINOPHEN 325 MG/1
975 TABLET ORAL EVERY 8 HOURS SCHEDULED
Status: DISCONTINUED | OUTPATIENT
Start: 2021-02-21 | End: 2021-02-22 | Stop reason: HOSPADM

## 2021-02-21 RX ADMIN — POTASSIUM CHLORIDE 30 MEQ: 20 SOLUTION ORAL at 12:57

## 2021-02-21 RX ADMIN — LEVOTHYROXINE SODIUM 75 MCG: 75 TABLET ORAL at 06:36

## 2021-02-21 RX ADMIN — OXYCODONE HYDROCHLORIDE 5 MG: 5 TABLET ORAL at 21:14

## 2021-02-21 RX ADMIN — ACETAMINOPHEN 975 MG: 325 TABLET ORAL at 21:13

## 2021-02-21 RX ADMIN — FINASTERIDE 5 MG: 5 TABLET, FILM COATED ORAL at 09:50

## 2021-02-21 RX ADMIN — LIDOCAINE 2 PATCH: 50 PATCH TOPICAL at 17:35

## 2021-02-21 RX ADMIN — ACETAMINOPHEN 975 MG: 325 TABLET ORAL at 13:38

## 2021-02-21 RX ADMIN — ACETAMINOPHEN 650 MG: 325 TABLET ORAL at 00:00

## 2021-02-21 RX ADMIN — HEPARIN SODIUM 5000 UNITS: 5000 INJECTION INTRAVENOUS; SUBCUTANEOUS at 21:14

## 2021-02-21 RX ADMIN — Medication 1000 UNITS: at 12:56

## 2021-02-21 RX ADMIN — TAMSULOSIN HYDROCHLORIDE 0.4 MG: 0.4 CAPSULE ORAL at 17:34

## 2021-02-21 RX ADMIN — GABAPENTIN 100 MG: 100 CAPSULE ORAL at 09:51

## 2021-02-21 RX ADMIN — OXYCODONE HYDROCHLORIDE 5 MG: 5 TABLET ORAL at 09:55

## 2021-02-21 RX ADMIN — OXYCODONE HYDROCHLORIDE 5 MG: 5 TABLET ORAL at 17:34

## 2021-02-21 RX ADMIN — HEPARIN SODIUM 5000 UNITS: 5000 INJECTION INTRAVENOUS; SUBCUTANEOUS at 13:38

## 2021-02-21 RX ADMIN — GABAPENTIN 100 MG: 100 CAPSULE ORAL at 21:13

## 2021-02-21 RX ADMIN — POTASSIUM CHLORIDE 20 MEQ: 1500 TABLET, EXTENDED RELEASE ORAL at 09:50

## 2021-02-21 RX ADMIN — OXYCODONE HYDROCHLORIDE 5 MG: 5 TABLET ORAL at 01:39

## 2021-02-21 RX ADMIN — ATORVASTATIN CALCIUM 10 MG: 10 TABLET, FILM COATED ORAL at 21:13

## 2021-02-21 RX ADMIN — SERTRALINE HYDROCHLORIDE 50 MG: 50 TABLET ORAL at 09:51

## 2021-02-21 RX ADMIN — PANTOPRAZOLE SODIUM 40 MG: 40 TABLET, DELAYED RELEASE ORAL at 09:50

## 2021-02-21 RX ADMIN — ACETAMINOPHEN 650 MG: 325 TABLET ORAL at 06:36

## 2021-02-21 NOTE — ASSESSMENT & PLAN NOTE
Acute T2 SEP compression fracture, chronic fractures of T1, T3, T4   · S/p fall at home on 2/17, presented to hospital as trauma on 2/20  On Eliquis for Afib  · Severe kyphosis at baseline  · Ambulates at home  · GCS 14 (confusion, very [place])  Moving all extremities  No weakness  Complains of right shoulder pain  Imaging:  · CT cervical spine 2/20/2021: Somewhat limited examination due to patient positioning and body habitus  2   Severe, age-indeterminate compression fracture involving both the superior and inferior endplates of T4 with near vertebral plana  There is mild bony retropulsion, resulting in mild central stenosis  2   Mild compression fracture superior endplate T2 which appears acute  3   Mild chronic appearing compression fractures involving the superior endplates of T1 and T3 vertebral bodies  4   Moderate degenerative changes of the cervical spine  No cervical spine fracture or traumatic malalignment  Plan:  · Frequent neuro checks  · Unlikely to tolerate any bracing  · Obtain upright x-rays for comparison  · Mobilize with PT/OT  · DVT ppx: SCDs, [facility]  Neurosurgery will continue to follow  Please call with any questions or concerns

## 2021-02-21 NOTE — PLAN OF CARE
Problem: PHYSICAL THERAPY ADULT  Goal: Performs mobility at highest level of function for planned discharge setting  See evaluation for individualized goals  Description: Treatment/Interventions: Functional transfer training, LE strengthening/ROM, Elevations, Therapeutic exercise, Endurance training, Bed mobility, Gait training, Spoke to nursing, OT          See flowsheet documentation for full assessment, interventions and recommendations  Note: Prognosis: Guarded  Problem List: Decreased strength, Decreased range of motion, Decreased endurance, Impaired balance, Decreased mobility, Decreased cognition, Pain  Assessment: Pt is a 80 y o  male seen for PT evaluation s/p admit to [facility] on 2/20/2021  Pt was admitted with a primary dx of: closed T2 fracture s/p fall  No neurosurgical intervention or bracing required at this time  PT now consulted for assessment of mobility and d/c needs  Pt with Up in chair, active PT eval orders  Pts current comorbidities effecting treatment include: h/o Anemia, Arthritis, Atrial fibrillation ([facility] [address] ), CAD (coronary artery disease), Cancer ([facility] [address] ), Diverticulosis, Kyphosis, and UTI (urinary tract infection)  Pts current clinical presentation is Unstable/ Unpredictable (high complexity) due to Ongoing medical management for primary dx, Increased reliance on more restrictive AD compared to baseline, Decreased activity tolerance compared to baseline, Fall risk, Increased assistance needed from caregiver at current time, Ongoing telemetry monitoring, Cog status  Pt poor historian, unable to obtain PLOF or home setup  Per EMR, pt resides at [facility]; will clarify further with CM  Upon evaluation, pt currently is requiring modAx2 for bed mobility and modAx2 for transfers with [facility]  Pt only able to tolerate standing <10s with extremely kyphotic posture   Pt presents at PT eval functioning below baseline and currently w/ overall mobility deficits 2* to: BLE weakness, impaired balance, decreased endurance, pain, decreased activity tolerance compared to baseline, decreased functional mobility tolerance compared to baseline, fall risk, decreased cognition  Pt currently at a fall risk 2* to impairments listed above  Pt will continue to benefit from skilled acute PT interventions to address stated impairments; to maximize functional mobility; for ongoing pt/ family training; and DME needs  At conclusion of PT session pt returned BTB, bed alarm engaged and RN notified of session findings/recommendations with phone and call bell within reach  Pt denies any further questions at this time  The patient's AM-PAC Basic Mobility Inpatient Short Form Raw Score is 10, Standardized Score is 25 72   A standardized score less than 42 9 suggests the patient may benefit from discharge to post-acute rehabilitation services  Please also refer to the recommendation of the Physical Therapist for safe discharge planning  Recommend return to facility with increased A pending clarification of PLOF upon hospital D/C  PT Discharge Recommendation: Return to previous environment with social support(return to facility with increased assist pending PLOF)          See flowsheet documentation for full assessment

## 2021-02-21 NOTE — ASSESSMENT & PLAN NOTE
-hemoglobin drifted from 12 1-10 7  -may be related to dilution as patient has no signs of bleeding clinically  Has a dominant oral exam is benign and he is nontender  Chest x-ray was negative on admission    -repeat H and H in a m   -this is asymptomatic at this time

## 2021-02-21 NOTE — ORTHOTIC NOTE
Orthotic Note            Date: 2/21/2021      Patient Name: Puma Martinez            Reason for Consult:  Patient Active Problem List   Diagnosis    Postural kyphosis of cervicothoracic region    Closed T2 fracture ([address] )    Subdural hygroma   [employer] TLSO     [employer] attempted to fit pt with [employer]  Per neurosurgery, brace is not advised at this time due to kyphosis  RN aware and will continue to follow up as needed  Recommendations:  Please call [employer] ext [phone] in regards to any bracing instructions and/or adjustments       [address] [facility] Restorative Technician, BS

## 2021-02-21 NOTE — PLAN OF CARE
Problem: Potential for Falls  Goal: Patient will remain free of falls  Description: INTERVENTIONS:  - Assess patient frequently for physical needs  -  Identify cognitive and physical deficits and behaviors that affect risk of falls    -  [place] fall precautions as indicated by assessment   - Educate patient/family on patient safety including physical limitations  - Instruct patient to call for assistance with activity based on assessment  - Modify environment to reduce risk of injury  - Consider OT/PT consult to assist with strengthening/mobility  Outcome: Progressing     Problem: Prexisting or High Potential for Compromised Skin Integrity  Goal: Skin integrity is maintained or improved  Description: INTERVENTIONS:  - Identify patients at risk for skin breakdown  - Assess and monitor skin integrity  - Assess and monitor nutrition and hydration status  - Monitor labs   - Assess for incontinence   - Turn and reposition patient  - Assist with mobility/ambulation  - Relieve pressure over bony prominences  - Avoid friction and shearing  - Provide appropriate hygiene as needed including keeping skin clean and dry  - Evaluate need for skin moisturizer/barrier cream  - Collaborate with interdisciplinary team   - Patient/family teaching  - Consider wound care consult   Outcome: Progressing     Problem: PAIN - ADULT  Goal: Verbalizes/displays adequate comfort level or baseline comfort level  Description: Interventions:  - Encourage patient to monitor pain and request assistance  - Assess pain using appropriate pain scale  - Administer analgesics based on type and severity of pain and evaluate response  - Implement non-pharmacological measures as appropriate and evaluate response  - Consider cultural and social influences on pain and pain management  - Notify physician/advanced practitioner if interventions unsuccessful or patient reports new pain  Outcome: Progressing     Problem: INFECTION - ADULT  Goal: Absence or prevention of progression during hospitalization  Description: INTERVENTIONS:  - Assess and monitor for signs and symptoms of infection  - Monitor lab/diagnostic results  - Monitor all insertion sites, i e  indwelling lines, tubes, and drains  - Monitor endotracheal if appropriate and nasal secretions for changes in amount and color  - [place] appropriate cooling/warming therapies per order  - Administer medications as ordered  - Instruct and encourage patient and family to use good hand hygiene technique  - Identify and instruct in appropriate isolation precautions for identified infection/condition  Outcome: Progressing     Problem: SAFETY ADULT  Goal: Patient will remain free of falls  Description: INTERVENTIONS:  - Assess patient frequently for physical needs  -  Identify cognitive and physical deficits and behaviors that affect risk of falls    -  [place] fall precautions as indicated by assessment   - Educate patient/family on patient safety including physical limitations  - Instruct patient to call for assistance with activity based on assessment  - Modify environment to reduce risk of injury  - Consider OT/PT consult to assist with strengthening/mobility  Outcome: Progressing  Goal: Maintain or return to baseline ADL function  Description: INTERVENTIONS:  -  Assess patient's ability to carry out ADLs; assess patient's baseline for ADL function and identify physical deficits which impact ability to perform ADLs (bathing, care of mouth/teeth, toileting, grooming, dressing, etc )  - Assess/evaluate cause of self-care deficits   - Assess range of motion  - Assess patient's mobility; develop plan if impaired  - Assess patient's need for assistive devices and provide as appropriate  - Encourage maximum independence but intervene and supervise when necessary  - Involve family in performance of ADLs  - Assess for home care needs following discharge   - Consider OT consult to assist with ADL evaluation and planning for discharge  - Provide patient education as appropriate  Outcome: Progressing  Goal: Maintain or return mobility status to optimal level  Description: INTERVENTIONS:  - Assess patient's baseline mobility status (ambulation, transfers, stairs, etc )    - Identify cognitive and physical deficits and behaviors that affect mobility  - Identify mobility aids required to assist with transfers and/or ambulation (gait belt, sit-to-stand, lift, walker, cane, etc )  - [place] fall precautions as indicated by assessment  - Record patient progress and toleration of activity level on Mobility SBAR; progress patient to next Phase/Stage  - Instruct patient to call for assistance with activity based on assessment  - Consider rehabilitation consult to assist with strengthening/weightbearing, etc   Outcome: Progressing     Problem: DISCHARGE PLANNING  Goal: Discharge to home or other facility with appropriate resources  Description: INTERVENTIONS:  - Identify barriers to discharge w/patient and caregiver  - Arrange for needed discharge resources and transportation as appropriate  - Identify discharge learning needs (meds, wound care, etc )  - Arrange for interpretive services to assist at discharge as needed  - Refer to Case Management Department for coordinating discharge planning if the patient needs post-hospital services based on physician/advanced practitioner order or complex needs related to functional status, cognitive ability, or social support system  Outcome: Progressing     Problem: Knowledge Deficit  Goal: Patient/family/caregiver demonstrates understanding of disease process, treatment plan, medications, and discharge instructions  Description: Complete learning assessment and assess knowledge base    Interventions:  - Provide teaching at level of understanding  - Provide teaching via preferred learning methods  Outcome: Progressing

## 2021-02-21 NOTE — PROGRESS NOTES
Progress Note - Niyah Render 6/30/1927, 80 y o  male MRN: [id number]    Unit/Bed#: [facility] 623-01 Encounter: [id number]    Primary Care Provider: Verónica Jimenez MD   Date and time admitted to hospital: 2/20/2021  3:06 PM        [address]  -sustaining the below stated injuries  -patient has been falling more frequently over the last month, had a recent fall with rib fractures and was admitted to [facility] and discharged to rehab following admission  -PT and OT evaluated the patient recommend discharge back to assisted living facility with increased support which they can provide  -geriatrics evaluation is pending    * Closed T2 fracture [facility])  Assessment & Plan  -Compression fx s/p fall  -appreciate neurosurgery evaluation and recommendations  No plans for bracing secondary to severe kyphosis  -follow-up upright thoraco lumbar spine x-rays  -patient is neuro intact  -PT and OT evaluated the patient recommending discharge back to assisted living with increased support  I spoke with Kimberli Castro where he resides who confirm he has required increased support with all mobility and feeds since he was discharged from only rehab about a week ago after an admission to [facility] for rib fractures  Postural kyphosis of cervicothoracic region  Assessment & Plan  -chronic    Subdural hygroma  Assessment & Plan  -Chronic, present on arrival  -appreciate neurosurgery recommendations, no follow-up needed  -okay to start subcutaneous heparin for DVT prophylaxis    Acute kidney injury [facility])  Assessment & Plan  -creatinine 1 4 on admission  Baseline creatinine is 1 1    Patient has returned to baseline this morning with IV fluid hydration   -this is likely related to reduced oral intake secondary to dysphagia  -Hep-Lock IV fluids  -continue to encourage oral intake    Anemia  Assessment & Plan  -hemoglobin drifted from 12 1-10 7  -may be related to dilution as patient has no signs of bleeding clinically  Has a dominant oral exam is benign and he is nontender  Chest x-ray was negative on admission  -repeat H and H in a m   -this is asymptomatic at this time    Sacral wound  Assessment & Plan  -present on admission  -wound care is following    Dysphagia  Assessment & Plan  -patient is on pureed diet with nectar thick liquids for the last week at his assisted living facility  -will ask speech therapy to evaluate to confirm that this is the least restrictive diet possible for him    Atrial fibrillation [facility])  Assessment & Plan  -rate is controlled on home metoprolol  -home Eliquis is held  DVT prophylaxis started today with subcutaneous heparin   -I spoke with the patient's grandson, Abel Tuttle regarding risks and benefits of anticoagulation given the patient's age and fall risk in the setting of his recent decline  Day but verbalizes understanding at this point time agrees with continuing to hold until he talks further with his brother who is the patient's POA as well as the patient's cardiologist and family doctor  I explained will continue to hold until his hemoglobin is stable, rechecking in the morning and that I will touch base with them by phone tomorrow on 02/22/2021  TERTIARY TRAUMA SURVEY NOTE    Prophylaxis: Sequential compression device (Venodyne)  and Heparin    Disposition:  Continue med surge status, discharge back to assisted living facility likely on 02/22/2021    Code status:  Level 3 - DNAR and DNI    Consultants:  Neurosurgery, geriatrics    Is the patient 65 years or older?: YES:    1  Before the illness or injury that brought you to the Emergency, did you need someone to help you on a regular basis? 1=Yes   2  Since the illness or injury that brought you to the Emergency, have you needed more help than usual to take care of yourself? 1=Yes   3   Have you been hospitalized for one or more nights during the past 6 months (excluding a stay in the Emergency Department)? 1=Yes   4  In general, do you see well? 0= no   5  In general, do you have serious problems with your memory? 1=Yes   6  Do you take more than three different medications everyday? 1=Yes   TOTAL   6     Did you order a geriatric consult if the score was 2 or greater?: yes          SUBJECTIVE:     Transfer from:  [facility]  Outside Films Received: yes  Tertiary Exam Due on:  02/21/2021    Mechanism of Injury:Fall    Chief Complaint:  My back hurts    HPI/Last 24 hour events:  Patient notes discomfort in his back  He denies pain elsewhere  He states he did not hit his head when he fell  He has no new complaints at this time      Active medications:           Current Facility-Administered Medications:     acetaminophen (TYLENOL) tablet 975 mg, 975 mg, Oral, Q8H MIKEY    ALPRAZolam (XANAX) tablet 0 5 mg, 0 5 mg, Oral, HS PRN    atorvastatin (LIPITOR) tablet 10 mg, 10 mg, Oral, HS, 10 mg at 02/20/21 2111    cholecalciferol (VITAMIN D3) tablet 1,000 Units, 1,000 Units, Oral, Daily    eplerenone (INSPRA) tablet 25 mg, 25 mg, Oral, Daily    [START ON 2/22/2021] ferrous sulfate tablet 325 mg, 325 mg, Oral, Daily With Breakfast    furosemide (LASIX) tablet 40 mg, 40 mg, Oral, Daily    gabapentin (NEURONTIN) capsule 100 mg, 100 mg, Oral, HS    heparin (porcine) subcutaneous injection 5,000 Units, 5,000 Units, Subcutaneous, Q8H [facility]    levothyroxine tablet 75 mcg, 75 mcg, Oral, Early Morning, 75 mcg at 02/21/21 0636    lidocaine (LIDODERM) 5 % patch 2 patch, 2 patch, Topical, Daily, 2 patch at 02/20/21 1824    metoprolol succinate (TOPROL-XL) 24 hr tablet 25 mg, 25 mg, Oral, HS    oxyCODONE (ROXICODONE) IR tablet 2 5 mg, 2 5 mg, Oral, Q4H PRN    oxyCODONE (ROXICODONE) IR tablet 5 mg, 5 mg, Oral, Q4H PRN, 5 mg at 02/21/21 0955    pantoprazole (PROTONIX) EC tablet 40 mg, 40 mg, Oral, Daily, 40 mg at 02/21/21 0950    potassium chloride oral solution 30 mEq, 30 mEq, Oral, Once  [facility] ON 2/22/2021] sertraline (ZOLOFT) tablet 25 mg, 25 mg, Oral, Daily    tamsulosin (FLOMAX) capsule 0 4 mg, 0 4 mg, Oral, Daily With Dinner, 0 4 mg at 02/20/21 1824      OBJECTIVE:     Vitals:   Vitals:    02/21/21 0954   BP: 93/54   Pulse: 71   Resp: 16   Temp: 98 3 °F (36 8 °C)   SpO2:        Physical Exam:   GENERAL APPEARANCE:  No acute distress, resting comfortably  NEURO:  GCS 14 (4, 4, 6), nonfocal exam  HEENT:  Normocephalic, atraumatic  CV:  Regular rate and rhythm, no murmurs gallops or rubs  LUNGS:  Clear to auscultation bilaterally  GI:  Soft, nontender, nondistended  :  Voiding  MSK:  Moving all extremities equally with full strength  SKIN:  Pink, warm, dry    I/O:   I/O       02/19 0701 - 02/20 0700 02/20 0701 - 02/21 0700 02/21 0701 - 02/22 0700    I V  (mL/kg)  937 5 (18 8)     Total Intake(mL/kg)  937 5 (18 8)     Net  +937 5            Unmeasured Urine Occurrence  1 x           Invasive Devices: Invasive Devices     Peripheral Intravenous Line            Peripheral IV 02/20/21 Distal;Left;Upper;Ventral (anterior) Arm less than 1 day                  Imaging:   Xr Trauma Chest Portable    Result Date: 2/20/2021  Impression: Right midlung linear atelectasis  Workstation performed: [id number]     Trauma - Ct Head Wo Contrast    Result Date: 2/20/2021  Impression: 1  Limited examination due to patient positioning  2   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  No acute intracranial abnormality  3   Chronic appearing bilateral subdural hygromas, left side larger than right  The study was marked in [facility] for immediate notification  Workstation performed: [id number]     Trauma - Ct Spine Cervical Wo Contrast    Result Date: 2/20/2021  Impression: 1  Somewhat limited examination due to patient positioning and body habitus  2   Severe, age-indeterminate compression fracture involving both the superior and inferior endplates of T4 with near vertebral plana    There is mild bony retropulsion, resulting in mild central stenosis  2   Mild compression fracture superior endplate T2 which appears acute  3   Mild chronic appearing compression fractures involving the superior endplates of T1 and T3 vertebral bodies  4   Moderate degenerative changes of the cervical spine  No cervical spine fracture or traumatic malalignment  If warranted, consider follow-up MRI of the thoracic spine, for further evaluation    I personally discussed this study with Meryle Pointer on 2/20/2021 at 11:56 AM   Workstation performed: [id number]       Labs:   CBC:   Lab Results   Component Value Date    WBC 3 69 (L) 02/21/2021    HGB 10 7 (L) 02/21/2021    HCT 33 8 (L) 02/21/2021     (H) 02/21/2021    PLT 53 (L) 02/21/2021    MCH 33 6 02/21/2021    MCHC 31 7 02/21/2021    RDW 15 6 (H) 02/21/2021    MPV 11 0 02/21/2021     CMP:   Lab Results   Component Value Date     02/21/2021    CO2 32 02/21/2021    BUN 29 (H) 02/21/2021    CREATININE 1 18 02/21/2021    CALCIUM 8 3 02/21/2021    EGFR 53 02/21/2021

## 2021-02-21 NOTE — PLAN OF CARE
Summary:  Pt presents w/ mod oral and suspected at least mild/mod pharyngeal dysphagia  Pt unable to masticate and fully breakdown advanced solids  Oral manipulation is prolonged and disorganized  Lingual pumping used to form a cohesive bolus  Swallows w/ at least mild delay and reduced rise to palpation  Pt w/ wet vocal quality and audible gurgling inconsistently w/ NTL, strong cough response and gurgling w/ thin liquids  Pt w/ at least mild aspiration risk, recommend strict aspiration precautions, dysphagia diet modifications described below, and ST f/u        Recommendations:  Diet: Puree  Liquid: Honey thick   Meds: Crush in puree  Supervision: Full

## 2021-02-21 NOTE — ASSESSMENT & PLAN NOTE
-sustaining the below stated injuries  -patient has been falling more frequently over the last month, had a recent fall with rib fractures and was admitted to [facility] and discharged to rehab following admission    -PT and OT evaluated the patient recommend discharge back to assisted living facility with increased support which they can provide  -geriatrics evaluation is pending

## 2021-02-21 NOTE — ASSESSMENT & PLAN NOTE
-patient is on pureed diet with nectar thick liquids for the last week at his assisted living facility  -will ask speech therapy to evaluate to confirm that this is the least restrictive diet possible for him

## 2021-02-21 NOTE — SPEECH THERAPY NOTE
Speech Language/Pathology  Speech/Language Pathology  Assessment    Patient Name: Juany Meza  KKUZR'T Date: 2/21/2021     Problem List  Principal Problem:    Closed T2 fracture ([facility] [address] )  Active Problems:    Postural kyphosis of cervicothoracic region    Subdural hygroma    Fall    Acute kidney injury ([facility] [address] )    Sacral wound    Dysphagia    [facility]    Anemia    Past Medical History  Past Medical History:   Diagnosis Date    Anemia     Arthritis     [facility]     CAD (coronary artery disease)     Cancer ([facility] )     prostate    Diverticulosis     Kyphosis     UTI (urinary tract infection)      Past Surgical History  Past Surgical History:   Procedure Laterality Date    ADENOIDECTOMY      CORONARY ARTERY BYPASS GRAFT      INGUINAL HERNIA REPAIR      [place] ESOPHAGOGASTRODUODENOSCOPY TRANSORAL DIAGNOSTIC N/A 9/5/2018    Procedure: EGD AND COLONOSCOPY;  Surgeon: Clifton Moreno DO;  [facility] OR;  Service: Gastroenterology    TONSILLECTOMY      TRANSURETHRAL RESECTION OF PROSTATE          Bedside Swallow Evaluation:    Summary:  Pt presents w/ mod oral and suspected at least mild/mod pharyngeal dysphagia  Pt unable to masticate and fully breakdown advanced solids  Oral manipulation is prolonged and disorganized  Lingual pumping used to form a cohesive bolus  Swallows w/ at least mild delay and reduced rise to palpation  Pt w/ wet vocal quality and audible gurgling inconsistently w/ NTL, strong cough response and gurgling w/ thin liquids  Pt w/ at least mild aspiration risk, recommend strict aspiration precautions, dysphagia diet modifications described below, and ST f/u  Recommendations:  Diet: Puree  Liquid: Honey thick   Meds: Crush in puree  Supervision: Full   Positioning:Upright  Strategies: Pt to take PO/Meds only when fully alert and upright     Oral care: 3-4x daily   Aspiration precautions  Reflux precautions    Therapy Prognosis: Guarded-fair   Prognosis considerations: Age, current medical, past medical   Frequency: As able     Goal(s):  Pt will tolerate least restrictive diet w/out s/s aspiration or oral/pharyngeal difficulties  Patient's goal: "I'll go for the orange juice"     Consider consult w/:  -    HPI:  79 y/o male with PMhx of CAD, Afib on eliquis, kyphosis, prior CABG who presents to [facility] ED as a transfer from [address] where he initially presented after a fall  Pt had a fall from standing on 2/17 and found to have a T12 fx as well as change in mental status   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 and chronic T1 & T3 fx's  T-spine tenderness on exam with significant kyphosis  GCS 14, confusion  Admit inpatient to M/S unit under trauma service with Mechanical fall on 6/14 complicated with acute T2 fx and change in mental status GCS 14, Chronic B/l subdural hygromas, L>R, Age indeterminate compression fx T4 w central stenosis, Chronic T1& T3 vertebral fx   Telem monitoring  Neuro checks q4h  Hold eliquis  Resume home meds  Geriatrics consult to avoid polypharmacy  Cr: 1 4 up from baseline 1 1  started isolyte @ 75cc/h for 24h  PT/OT  Pain control  NSx consulted      NSx consult 2/21 -- neuro checks q4h   Unlikely to tolerate any bracing  Obtain upright x-rays for comparison  Mobilize with PT/OT  DVT ppx: SCDs, HSC      2/21 -- pt has been falling frequently over the past month and requiring more assistance at his assisted liviing facility since being hospitalized with covid infection [facility] month  Facility also states he has trouble swallowing and has been on a pureed diet with nectar thick liquids since he has been back   creatinine returned to baseline  D/c IVF's  Encourage po intake  Speech therapy to confirm diet  Continue to hold Eliquis   Ok to start Hep sc for DVT ppx      Reason for consult:  R/o aspiration  Determine safest and least restrictive diet  h/o dysphagia     Precautions:  Fall     Current diet:  Puree w/ NTL Premorbid diet[de-identified]  Puree w/ NTL     Previous VBS:  None on record    O2 requirement:  RA    Voice/Speech:  Strong, clear     Social:  [address]     Follows commands: Yes                         Cognitive Status:  Awake, alert    Oral [facility] exam:  Dentition: natural   Labial strength and ROM: wfl   Lingual strength and ROM: wfl   Mandibular strength and ROM: wfl   [place]um: unable to visualize   Secretion management: wfl   Volitional cough: weak   Volitional swallow: unable to elicit   Oral care: good condition     Items administered:  Puree, soft solid, hard solid, honey thick liquid, nectar thick liquid, thin liquids  Liquids were taken by straw/cup       Oral stage:  Lip closure: wfl   Mastication: prolonged for advanced solids, unable to breakdown toast   Bolus formation: prolonged   Bolus control: fair, suspect reduced w/ thin liquids   Transfer: wfl   Oral residue: no   Pocketing: no     Pharyngeal stage:  Swallow promptness: mild/mod delay   Laryngeal rise: reduced to palpation   Wet voice: w/ NTL   Throat clear: -   Cough: w/ thin liquids   Secondary swallows: w/ all   Audible swallows: w/ all     Esophageal stage:  No s/s reported  H/o GERD    Aspiration precautions posted    Results d/w:  Pt, nursing

## 2021-02-21 NOTE — OCCUPATIONAL THERAPY NOTE
Occupational Therapy Cancellation Note        Patient Name: Gely ALVAREZ Date: 2/21/2021 02/21/21 0800   OT Last Visit   OT Visit Date 02/21/21   Note Type   Note type Evaluation   Cancel Reasons Medical status       OT orders received, chart reviewed  Noted that pt is currently pending neurosurgery consult  OT will hold at this time and continue to follow and see as medically appropriate and able       FELIZ Willson, OTR/L

## 2021-02-21 NOTE — ASSESSMENT & PLAN NOTE
-Chronic, present on arrival  -appreciate neurosurgery recommendations, no follow-up needed  -okay to start subcutaneous heparin for DVT prophylaxis

## 2021-02-21 NOTE — ASSESSMENT & PLAN NOTE
-Compression fx s/p fall  -appreciate neurosurgery evaluation and recommendations  No plans for bracing secondary to severe kyphosis  -follow-up upright thoraco lumbar spine x-rays  -patient is neuro intact  -PT and OT evaluated the patient recommending discharge back to assisted living with increased support  I spoke with Danae Uzair where he resides who confirm he has required increased support with all mobility and feeds since he was discharged from only rehab about a week ago after an admission to [facility] for rib fractures

## 2021-02-21 NOTE — ASSESSMENT & PLAN NOTE
-creatinine 1 4 on admission  Baseline creatinine is 1 1    Patient has returned to baseline this morning with IV fluid hydration   -this is likely related to reduced oral intake secondary to dysphagia  -Hep-Lock IV fluids  -continue to encourage oral intake

## 2021-02-21 NOTE — OCCUPATIONAL THERAPY NOTE
Occupational Therapy Evaluation     Patient Name: Yue SILVA Date: 2/21/2021  Problem List  Principal Problem:    Closed T2 fracture ([facility] [address] )  Active Problems:    Postural kyphosis of cervicothoracic region    Subdural hygroma    Fall    Acute kidney injury ([facility] [address] )    Sacral wound    Dysphagia    Atrial fibrillation ([facility] [address] )    Anemia    Past Medical History  Past Medical History:   Diagnosis Date    Anemia     Arthritis     Atrial fibrillation ([facility] [address]     CAD (coronary artery disease)     Cancer ([facility] [address] )     prostate    Diverticulosis     Kyphosis     UTI (urinary tract infection)      Past Surgical History  Past Surgical History:   Procedure Laterality Date    ADENOIDECTOMY      CORONARY ARTERY BYPASS GRAFT      INGUINAL HERNIA REPAIR      AR ESOPHAGOGASTRODUODENOSCOPY TRANSORAL DIAGNOSTIC N/A 9/5/2018    Procedure: EGD AND COLONOSCOPY;  Surgeon: Kamille Thomas DO;  Location:  MAIN OR;  Service: Gastroenterology    TONSILLECTOMY      TRANSURETHRAL RESECTION OF PROSTATE             02/21/21 1024   OT Last Visit   OT Visit Date 02/21/21   Note Type   Note type Evaluation   Restrictions/Precautions   Braces or Orthoses   (no brace per neurosurgery)   Other Precautions Cognitive; Bed Alarm;Multiple lines;Telemetry; Fall Risk;Pain   Pain Assessment   Pain Assessment Tool FLACC   Pain Location/Orientation Location: Back   Pain Rating: FLACC (Rest) - Face 0   Pain Rating: FLACC (Rest) - Legs 0   Pain Rating: FLACC (Rest) - Activity 0   Pain Rating: FLACC (Rest) - Cry 0   Pain Rating: FLACC (Rest) - Consolability 0   Score: FLACC (Rest) 0   Pain Rating: FLACC (Activity) - Face 1   Pain Rating: FLACC (Activity) - Legs 0   Pain Rating: FLACC (Activity) - Activity 0   Pain Rating: FLACC (Activity) - Cry 1   Pain Rating: FLACC (Activity) - Consolability 1   Score: FLACC (Activity) 3   Home Living   Additional Comments Pt is a poor historian  Per EMR, pt lives at [address] [place] [address] Function   Level of [place] Needs assistance with IADLs; Needs assistance with ADLs and functional mobility   Lives With Facility staff   Receives Help From   (facility staff)   ADL Assistance Needs assistance   IADLs Needs assistance   Falls in the last 6 months   (unable to report)   Vocational Retired   Lifestyle   Autonomy Pt is a poor historian  Per EMR, pt was discharged from rehab 1 week ago and has required increased A with ADLS and mobility since then  Pt reports that he is able to walk at baseline  Reciprocal Relationships Pt receives assistance from facility staff   Service to Others Retired   [address] to report, will continue to assess   Subjective   Subjective "How am I supposed to know where I live"    ADL   Where Assessed Edge of bed   Eating Assistance 4  Minimal Assistance   Grooming Assistance 4  Minimal Assistance   UB Bathing Assistance 3  Moderate Assistance   LB Bathing Assistance 2  Maximal Assistance   UB Dressing Assistance 3  Moderate Assistance   LB Dressing Assistance 2  Maximal [address]  2  Maximal Assistance   Bed Mobility   Supine to Sit 3  Moderate assistance   Additional items Assist x 2; Increased time required;Verbal cues;LE management   Sit to Supine 3  Moderate assistance   Additional items Assist x 2; Increased time required;Verbal cues;LE management   Additional Comments Pt able to sit EOB with min A for trunk control  After OT session pt in bed with alarm on and all needs within reach  Transfers   Sit to Stand 3  Moderate assistance   Additional items Assist x 2; Increased time required;Verbal cues   Stand to Sit 3  Moderate assistance   Additional items Assist x 2; Increased time required;Verbal cues   Functional Mobility   Additional Comments Pt unable to take any steps this session     Balance   Static Sitting Poor +   Dynamic Sitting Poor +   Static Standing Poor   Dynamic Standing Poor   Activity Tolerance   Activity Tolerance Patient limited by fatigue   Medical Staff Made Aware PT Mary   Nurse Made Aware RN confirmed okay to see pt   Cognition   Overall Cognitive Status Impaired   Arousal/Participation Arousable   Attention Attends with cues to redirect   Orientation Level Oriented to place; Disoriented to place; Disoriented to time   Memory Decreased recall of precautions;Decreased recall of recent events   Following Commands Follows one step commands with increased time or repetition   Comments Pt requires some envouragement, but is overall pleasantly confused  Assessment   Limitation Decreased ADL status; Decreased endurance;Decreased cognition;Decreased self-care trans;Decreased high-level ADLs   Prognosis Fair   Assessment Pt is a 80 y o  male admitted to [facility] on [facility] w/ fall and closed T2 fracture  No neurosurgical intervention or brace at this time  Comorbidities include a h/o Anemia, Arthritis, Atrial fibrillation ([facility] [address] CAD (coronary artery disease), Cancer ([facility] [address] ), Diverticulosis, Kyphosis, and UTI (urinary tract infection)  Pt with active OT orders and up in chair orders  Pt is a poor historian  Per EMR, pt lives at [address] [place] [address]  Per EMR, pt was discharged from rehab 1 week ago and has required increased A with ADLS and mobility since then  Pt reports that he is able to walk at baseline  Currently pt is mod A x2 for functional transfers, mod A for UB ADLS and max A for LB ADLS  Pt is limited at this time 2*: pain, endurance, activity tolerance, functional mobility, forward functional reach, functional standing tolerance and decreased I w/ ADLS/IADLS  The following Occupational Performance Areas to address include: grooming, bathing/shower, toilet hygiene, dressing, functional mobility and clothing management  Based on the aforementioned OT evaluation, functional performance deficits, and assessments, pt has been identified as a high complexity evaluation   From OT standpoint, anticipate d/c return to facility with increased assistance pending clarification of PLOF and available support    Pt to continue to benefit from acute immediate OT services to address the following goals 2-3x/week to  w/in 10-14 days: [employer] Goals   Patient Goals To rest   LTG Time Frame 10-   Long Term Goal #1 See goals below   Plan   Treatment Interventions ADL retraining;Functional transfer training;UE strengthening/ROM; Endurance training;Cognitive reorientation;Equipment evaluation/education;Patient/family training; Compensatory technique education;Continued evaluation; Energy conservation; Activityengagement   Goal Expiration Date 21   OT Frequency 2-3x/wk   Recommendation   OT Discharge Recommendation Return to previous environment with social support  (return to facility with increased support pending clarification of PLOF and available support)   OT - OK to Discharge Yes  (When medically appropriate)   Modified [place] Scale   Modified [place] Scale 4     GOALS    1) Pt will increase activity tolerance to G for 30 min txment sessions    2) Pt will complete UB/LB dressing/self care w/ min A using adaptive device and DME as needed    3) Pt will complete bathing w/ min A w/ use of AE and DME as needed    4) Pt will complete toileting w/ min A w/ G hygiene/thoroughness using DME as needed    5) Pt will improve functional transfers to min A on/off all surfaces using DME as needed w/ G balance/safety     6) Pt will tolerate bed mobility and EOB seated tasks w/ min A for min 30 mins to engage in fx'l I/ADL/leisure tasks    7) Pt will demonstrate G carryover of pt/caregiver education and training as appropriate  8) Pt will independently identify and utilize 2-3 coping strategies to increase positive affect and promote overall well-being      9) Pt will engage in ongoing cognitive assessment w/ G participation to assist w/ safe d/c planning/recommendations (as needed)    *OTR to assess functional mobility as appropriate      Joan Gunn, MOT, OTR/L

## 2021-02-21 NOTE — PHYSICAL THERAPY NOTE
Physical Therapy Cancellation Note    Patient's Name: Rachel Estrella       02/21/21 6790   PT Last Visit   PT Visit Date 02/21/21   Note Type   Note type Evaluation   Cancel Reasons Medical status       Orders received, chart reviewed  Pt pending neurosurgery consult, will hold PT at this time  Will follow for PT evaluation/treatment as medically appropriate  Thank you       Eloisa Phan, PT, DPT

## 2021-02-21 NOTE — PLAN OF CARE
Problem: Potential for Falls  Goal: Patient will remain free of falls  Description: INTERVENTIONS:  - Assess patient frequently for physical needs  -  Identify cognitive and physical deficits and behaviors that affect risk of falls    -  [place] fall precautions as indicated by assessment   - Educate patient/family on patient safety including physical limitations  - Instruct patient to call for assistance with activity based on assessment  - Modify environment to reduce risk of injury  - Consider OT/PT consult to assist with strengthening/mobility  Outcome: Progressing     Problem: Prexisting or High Potential for Compromised Skin Integrity  Goal: Skin integrity is maintained or improved  Description: INTERVENTIONS:  - Identify patients at risk for skin breakdown  - Assess and monitor skin integrity  - Assess and monitor nutrition and hydration status  - Monitor labs   - Assess for incontinence   - Turn and reposition patient  - Assist with mobility/ambulation  - Relieve pressure over bony prominences  - Avoid friction and shearing  - Provide appropriate hygiene as needed including keeping skin clean and dry  - Evaluate need for skin moisturizer/barrier cream  - Collaborate with interdisciplinary team   - Patient/family teaching  - Consider wound care consult   Outcome: Progressing     Problem: PAIN - ADULT  Goal: Verbalizes/displays adequate comfort level or baseline comfort level  Description: Interventions:  - Encourage patient to monitor pain and request assistance  - Assess pain using appropriate pain scale  - Administer analgesics based on type and severity of pain and evaluate response  - Implement non-pharmacological measures as appropriate and evaluate response  - Consider cultural and social influences on pain and pain management  - Notify physician/advanced practitioner if interventions unsuccessful or patient reports new pain  Outcome: Progressing     Problem: INFECTION - ADULT  Goal: Absence or prevention of progression during hospitalization  Description: INTERVENTIONS:  - Assess and monitor for signs and symptoms of infection  - Monitor lab/diagnostic results  - Monitor all insertion sites, i e  indwelling lines, tubes, and drains  - Monitor endotracheal if appropriate and nasal secretions for changes in amount and color  - [place] appropriate cooling/warming therapies per order  - Administer medications as ordered  - Instruct and encourage patient and family to use good hand hygiene technique  - Identify and instruct in appropriate isolation precautions for identified infection/condition  Outcome: Progressing     Problem: SAFETY ADULT  Goal: Patient will remain free of falls  Description: INTERVENTIONS:  - Assess patient frequently for physical needs  -  Identify cognitive and physical deficits and behaviors that affect risk of falls    -  [place] fall precautions as indicated by assessment   - Educate patient/family on patient safety including physical limitations  - Instruct patient to call for assistance with activity based on assessment  - Modify environment to reduce risk of injury  - Consider OT/PT consult to assist with strengthening/mobility  Outcome: Progressing  Goal: Maintain or return to baseline ADL function  Description: INTERVENTIONS:  -  Assess patient's ability to carry out ADLs; assess patient's baseline for ADL function and identify physical deficits which impact ability to perform ADLs (bathing, care of mouth/teeth, toileting, grooming, dressing, etc )  - Assess/evaluate cause of self-care deficits   - Assess range of motion  - Assess patient's mobility; develop plan if impaired  - Assess patient's need for assistive devices and provide as appropriate  - Encourage maximum independence but intervene and supervise when necessary  - Involve family in performance of ADLs  - Assess for home care needs following discharge   - Consider OT consult to assist with ADL evaluation and planning for discharge  - Provide patient education as appropriate  Outcome: Progressing  Goal: Maintain or return mobility status to optimal level  Description: INTERVENTIONS:  - Assess patient's baseline mobility status (ambulation, transfers, stairs, etc )    - Identify cognitive and physical deficits and behaviors that affect mobility  - Identify mobility aids required to assist with transfers and/or ambulation (gait belt, sit-to-stand, lift, walker, cane, etc )  - [place] fall precautions as indicated by assessment  - Record patient progress and toleration of activity level on Mobility SBAR; progress patient to next Phase/Stage  - Instruct patient to call for assistance with activity based on assessment  - Consider rehabilitation consult to assist with strengthening/weightbearing, etc   Outcome: Progressing

## 2021-02-21 NOTE — CONSULTS
Consult- Iza Momin 6/30/1927, 80 y o  male MRN: [id number]    Unit/Bed#: [facility] 623-01 Encounter: [id number]    Primary Care Provider: Karen Thakkar MD   Date and time admitted to hospital: 2/20/2021  3:06 PM      Inpatient consult to Neurosurgery  Consult performed by: YURY Rowley  Consult ordered by: Lori Brown MD          Subdural hygroma  Assessment & Plan  S/p fall at home on Eliquis on 2/17  Risk versus benefit discussion regarding Eliquis in this 80year old patient with frequent falls  No repeat imaging needed  Imaging:  · CT head 2/21/2021: Limited examination due to patient positioning  2   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  No acute intracranial abnormality  3   Chronic appearing bilateral subdural hygromas, left side larger than right  * Closed T2 fracture (HCC)  Assessment & Plan  Acute T2 SEP compression fracture, chronic fractures of T1, T3, T4   · S/p fall at home on 2/17, presented to hospital as trauma on 2/20  On Eliquis for Afib  · Severe kyphosis at baseline  · Ambulates at home  · GCS 14 (confusion, very [place])  Moving all extremities  No weakness  Complains of right shoulder pain  Imaging:  · CT cervical spine 2/20/2021: Somewhat limited examination due to patient positioning and body habitus  2   Severe, age-indeterminate compression fracture involving both the superior and inferior endplates of T4 with near vertebral plana  There is mild bony retropulsion, resulting in mild central stenosis  2   Mild compression fracture superior endplate T2 which appears acute  3   Mild chronic appearing compression fractures involving the superior endplates of T1 and T3 vertebral bodies  4   Moderate degenerative changes of the cervical spine  No cervical spine fracture or traumatic malalignment  Plan:  · Frequent neuro checks  · Unlikely to tolerate any bracing  · Obtain upright x-rays for comparison    · Mobilize with PT/OT  · DVT ppx: SCDs, HSC  Neurosurgery will continue to follow  Please call with any questions or concerns  History of Present Illness     HPI: Saundra Kuhn is a 80 y o  male with PMH including CAD, Afib on Eliquis, CABG, who presented as a trauma evaluation after sustaining a fall on 2/17  On imaging he was noted with chronic bilateral hygromas as well as acute T2 fractures and age-indeterminate/chronic T1, T3, T4 fractures  He complains of right upper back/shoulder pain  He denies any numbness or weakness  Denies any weakness/ Denies BBI  Does not recall falling  Very hard of hearing  Review of Systems   Constitutional: Negative  Negative for activity change, appetite change and fatigue  HENT: Negative for ear pain, hearing loss, nosebleeds, postnasal drip, tinnitus, trouble swallowing and voice change  Eyes: Negative for pain and visual disturbance  Respiratory: Negative for chest tightness and shortness of breath  Cardiovascular: Negative for chest pain, palpitations and leg swelling  Gastrointestinal: Negative for abdominal pain, diarrhea, nausea and vomiting  Musculoskeletal: Positive for back pain, neck pain and neck stiffness  Skin: Negative for color change and pallor  Neurological: Negative for dizziness, tremors, seizures, syncope, facial asymmetry, speech difficulty, weakness, light-headedness, numbness and headaches  Psychiatric/Behavioral: Positive for confusion  Negative for agitation and behavioral problems         Historical Information   Past Medical History:   Diagnosis Date    Anemia     Arthritis     Atrial fibrillation ([address] )     CAD (coronary artery disease)     Cancer (HCC)     prostate    Diverticulosis     Kyphosis     UTI (urinary tract infection)      Past Surgical History:   Procedure Laterality Date    ADENOIDECTOMY      CORONARY ARTERY BYPASS GRAFT      INGUINAL HERNIA REPAIR      MT ESOPHAGOGASTRODUODENOSCOPY TRANSORAL DIAGNOSTIC N/A 9/5/2018    Procedure: EGD AND COLONOSCOPY;  Surgeon: Cheryl Fink DO;  Location: [facility];  Service: Gastroenterology    TONSILLECTOMY      TRANSURETHRAL RESECTION OF PROSTATE       Social History     Substance and Sexual Activity   Alcohol Use None     Social History     Substance and Sexual Activity   Drug Use Not on file     Social History     Tobacco Use   Smoking Status Former Smoker   Smokeless Tobacco Never Used     History reviewed  No pertinent family history      Meds/Allergies   all current active meds have been reviewed and current meds:   Current Facility-Administered Medications   Medication Dose Route Frequency    acetaminophen (TYLENOL) tablet 650 mg  650 mg Oral Q6H [address]    ALPRAZolam (XANAX) tablet 0 25 mg  0 25 mg Oral HS PRN    atorvastatin (LIPITOR) tablet 10 mg  10 mg Oral HS    eplerenone (INSPRA) tablet 25 mg  25 mg Oral Daily    finasteride (PROSCAR) tablet 5 mg  5 mg Oral Daily    furosemide (LASIX) tablet 40 mg  40 mg Oral Daily    gabapentin (NEURONTIN) capsule 100 mg  100 mg Oral TID    heparin (porcine) subcutaneous injection 5,000 Units  5,000 Units Subcutaneous Q8H [address]    levothyroxine tablet 75 mcg  75 mcg Oral Early Morning    lidocaine (LIDODERM) 5 % patch 2 patch  2 patch Topical Daily    metoprolol succinate (TOPROL-XL) 24 hr tablet 25 mg  25 mg Oral HS    multi-electrolyte (PLASMALYTE-A/ISOLYTE-S PH 7 4) IV solution  75 mL/hr Intravenous Continuous    ondansetron (ZOFRAN) injection 4 mg  4 mg Intravenous Q6H PRN    oxyCODONE (ROXICODONE) IR tablet 2 5 mg  2 5 mg Oral Q4H PRN    oxyCODONE (ROXICODONE) IR tablet 5 mg  5 mg Oral Q4H PRN    pantoprazole (PROTONIX) EC tablet 40 mg  40 mg Oral Daily    potassium chloride (K-DUR,KLOR-CON) CR tablet 20 mEq  20 mEq Oral Daily    sertraline (ZOLOFT) tablet 50 mg  50 mg Oral Daily    tamsulosin (FLOMAX) capsule 0 4 mg  0 4 mg Oral Daily With Dinner     Allergies   Allergen Reactions    Other Rash Adhesive tape       Objective   I/O       02/19 0701 - 02/20 0700 02/20 0701 - 02/21 0700 02/21 0701 - 02/22 0700    I V  (mL/kg)  937 5 (18 8)     Total Intake(mL/kg)  937 5 (18 8)     Net  +937 5            Unmeasured Urine Occurrence  1 x           Physical Exam  Constitutional:       General: He is not in acute distress  Appearance: He is well-developed  He is not diaphoretic  Comments: Frail   Eyes:      General:         Right eye: No discharge  Left eye: No discharge  Extraocular Movements: EOM normal       Conjunctiva/sclera: Conjunctivae normal       Pupils: Pupils are equal, round, and reactive to light  Neck:      Musculoskeletal: Normal range of motion and neck supple  Comments: Severe kyphosis  Pulmonary:      Effort: Pulmonary effort is normal  No respiratory distress  Abdominal:      General: Bowel sounds are normal  There is no distension  Palpations: Abdomen is soft  Tenderness: There is no abdominal tenderness  Musculoskeletal: Normal range of motion  Skin:     General: Skin is warm and dry  Neurological:      Mental Status: He is alert  Cranial Nerves: No cranial nerve deficit  Sensory: No sensory deficit  Motor: No weakness  Coordination: Coordination normal  Finger-Nose-Finger Test normal       Deep Tendon Reflexes: Reflexes normal       Reflex Scores:       Tricep reflexes are 1+ on the right side and 1+ on the left side  Bicep reflexes are 1+ on the right side and 1+ on the left side  Brachioradialis reflexes are 1+ on the right side and 1+ on the left side  Patellar reflexes are 1+ on the right side and 1+ on the left side  Achilles reflexes are 1+ on the right side and 1+ on the left side  Psychiatric:         Speech: Speech normal          Behavior: Behavior normal          Thought Content:  Thought content normal          Judgment: Judgment normal        Neurologic Exam     Mental Status   Oriented to person  Disoriented to place  Oriented to time  Disoriented to year, month and date  Registration: recalls 3 of 3 objects  Attention: normal  Concentration: normal    Speech: speech is normal   Level of consciousness: alert  Able to name object  Cranial Nerves   Cranial nerves II through XII intact  CN III, IV, VI   Pupils are equal, round, and reactive to light  Extraocular motions are normal    Right pupil: Size: 3 mm  Shape: regular  Reactivity: brisk  Consensual response: intact  Accommodation: intact  Left pupil: Size: 3 mm  Shape: regular  Reactivity: brisk  Consensual response: intact  Accommodation: intact  Nystagmus: none   Diplopia: none  Conjugate gaze: present    CN V   Right facial sensation deficit: none  Left facial sensation deficit: none    CN VII   Facial expression full, symmetric       CN VIII   Hearing: intact    CN IX, X   Palate: symmetric    CN XI   Right sternocleidomastoid strength: normal  Left sternocleidomastoid strength: normal  Right trapezius strength: normal  Left trapezius strength: normal    CN XII   Tongue: not atrophic  Fasciculations: absent  Tongue deviation: none    Motor Exam   Muscle bulk: normal  Overall muscle tone: normal  Right arm pronator drift: absent  Left arm pronator drift: absent    Strength   Right deltoid: 5/5  Left deltoid: 5/5  Right biceps: 5/5  Left biceps: 5/5  Right triceps: 5/5  Left triceps: 5/5  Right quadriceps: 5/5  Left quadriceps: 5/5  Right hamstrin/5  Left hamstrin/5  Right anterior tibial: 5/5  Left anterior tibial: 5/5  Right posterior tibial: 5/5  Left posterior tibial: 5/5  Right peroneal: 5/5  Left peroneal: 5/5  Right gastroc: 5/5  Left gastroc: 5/5    Sensory Exam   Light touch normal    Proprioception normal      Gait, Coordination, and Reflexes     Coordination   Finger to nose coordination: normal    Tremor   Resting tremor: absent  Intention tremor: absent  Action tremor: absent    Reflexes   Right brachioradialis: 1+  Left brachioradialis: 1+  Right biceps: 1+  Left biceps: 1+  Right triceps: 1+  Left triceps: 1+  Right patellar: 1+  Left patellar: 1+  Right achilles: 1+  Left achilles: 1+  Right : 1+  Left : 1+      Vitals:Blood pressure 134/77, pulse 59, temperature 97 6 °F (36 4 °C), resp  rate 16, height 5' 6" (1 676 m), weight 49 9 kg (110 lb), SpO2 96 %  ,Body mass index is 17 75 kg/m²  Lab Results:   Results from last 7 days   Lab Units 02/21/21  0217 02/20/21  1114   WBC Thousand/uL 3 69* 5 13   HEMOGLOBIN g/dL 10 7* 12 1   HEMATOCRIT % 33 8* 37 7   PLATELETS Thousands/uL 53* 59*   NEUTROS PCT %  --  73   MONOS PCT %  --  5     Results from last 7 days   Lab Units 02/20/21  1854 02/20/21  1114   POTASSIUM mmol/L 4 7 3 8   CHLORIDE mmol/L 107 106   CO2 mmol/L 32 34*   BUN mg/dL 28* 28*   CREATININE mg/dL 1 24 1 46*   CALCIUM mg/dL 8 1* 8 0*     Results from last 7 days   Lab Units 02/20/21  1854   MAGNESIUM mg/dL 2 3     Results from last 7 days   Lab Units 02/20/21  1854   PHOSPHORUS mg/dL 3 3     Results from last 7 days   Lab Units 02/21/21  0137 02/20/21  1114   INR  1 00 1 05   PTT seconds  --  32     No results found for: TROPONINT  ABG:No results found for: PHART, WGH1EGU, PO2ART, IEF9VWG, L2RTILJA, BEART, SOURCE    Imaging Studies: I have personally reviewed pertinent reports  and I have personally reviewed pertinent films in PACS    Xr Trauma Chest Portable    Result Date: 2/20/2021  Impression: Right midlung linear atelectasis  Workstation performed: [id number]     Trauma - Ct Head Wo Contrast    Result Date: 2/20/2021  Impression: 1  Limited examination due to patient positioning  2   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  No acute intracranial abnormality  3   Chronic appearing bilateral subdural hygromas, left side larger than right  The study was marked in [facility] for immediate notification   Workstation performed: [id number]     Trauma - Ct Spine Cervical Wo Contrast    Result Date: 2/20/2021  Impression: 1  Somewhat limited examination due to patient positioning and body habitus  2   Severe, age-indeterminate compression fracture involving both the superior and inferior endplates of T4 with near vertebral plana  There is mild bony retropulsion, resulting in mild central stenosis  2   Mild compression fracture superior endplate T2 which appears acute  3   Mild chronic appearing compression fractures involving the superior endplates of T1 and T3 vertebral bodies  4   Moderate degenerative changes of the cervical spine  No cervical spine fracture or traumatic malalignment  If warranted, consider follow-up MRI of the thoracic spine, for further evaluation  I personally discussed this study with Kayli Jorgensen on 2/20/2021 at 11:56 AM   Workstation performed: [id number]       EKG, Pathology, and Other Studies: I have personally reviewed pertinent reports  and I have personally reviewed pertinent films in PACS    VTE Prophylaxis: Sequential compression device (Venodyne)  and Heparin    Code Status: Level 3 - DNAR and DNI  Advance Directive and Living Will:      Power of :    POLST:      Counseling / Coordination of Care  I spent 20 minutes with the patient

## 2021-02-21 NOTE — ASSESSMENT & PLAN NOTE
S/p fall at home on Eliquis on 2/17  Risk versus benefit discussion regarding Eliquis in this 80year old patient with frequent falls  No repeat imaging needed  Imaging:  · CT head 2/21/2021: Limited examination due to patient positioning  2   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  No acute intracranial abnormality  3   Chronic appearing bilateral subdural hygromas, left side larger than right

## 2021-02-21 NOTE — PLAN OF CARE
Problem: OCCUPATIONAL THERAPY ADULT  Goal: Performs self-care activities at highest level of function for planned discharge setting  See evaluation for individualized goals  Description: Treatment Interventions: ADL retraining, Functional transfer training, UE strengthening/ROM, Endurance training, Cognitive reorientation, Equipment evaluation/education, Patient/family training, Compensatory technique education, Continued evaluation, Energy conservation, Activityengagement          See flowsheet documentation for full assessment, interventions and recommendations  Note: Limitation: Decreased ADL status, Decreased endurance, Decreased cognition, Decreased self-care trans, Decreased high-level ADLs  Prognosis: Fair  Assessment: Pt is a 80 y o  male [facility] to [facility] [place] [facility] w/ fall and closed T2 fracture  No neurosurgical intervention or brace at this time  Comorbidities include a h/o Anemia, Arthritis, Atrial fibrillation ([facility] [address] ), CAD (coronary artery disease), Cancer ([facility] [address] ), Diverticulosis, Kyphosis, and UTI (urinary tract infection)  Pt with active OT orders and up in chair orders  Pt is a poor historian  Per EMR, pt lives at [address] [place] [address]  Per EMR, pt was discharged from rehab 1 week ago and has required increased A with ADLS and mobility since then  Pt reports that he is able to walk at baseline  Currently pt is mod A x2 for functional transfers, mod A for UB ADLS and max A for LB ADLS  Pt is limited at this time 2*: pain, endurance, activity tolerance, functional mobility, forward functional reach, functional standing tolerance and decreased I w/ ADLS/IADLS  The following Occupational Performance Areas to address include: grooming, bathing/shower, toilet hygiene, dressing, functional mobility and clothing management  Based on the aforementioned OT evaluation, functional performance deficits, and assessments, pt has been identified as a high complexity evaluation   From OT standpoint, anticipate d/c return to facility with increased assistance pending clarification of PLOF and available support    Pt to continue to benefit from acute immediate OT services to address the following goals 2-3x/week to  w/in 10-14 days:       OT Discharge Recommendation: Return to previous environment with social support(with increased support)  OT - OK to Discharge: Yes(When medically appropriate)

## 2021-02-21 NOTE — PROGRESS NOTES
Medications confirmed with [facility] patient [facility]  Acetaminophen 1000 mg daily  Eplerenone 25 mg daily  Ferrous Sulfate 325 mg daily  Levothyroxine 75 mg daily  Pantoprazole 40 mg daily  Potassium 30 MEq daily  Sertraline 25 mg daily  Vitamin D3 5000 U daily  Tamsulosin 0 4 mg qhs  Alprazolam 0 5 mg qhs  Atorvastatin 10 mg qhs  Metoprolol succinate ER 25 mg qhs  Tramadol 50 mg qhs    I spoke with his nurse Alex Ricci who confirms that the patient has needed more help over the last week  He fell after having COVID last month and was admitted to [facility] with rib fractures and then went to [facility] [place] [facility]"" rehab facility  He has been back at his assisted living facility for about a week and since that time has been a full feet and has required 1 on 1 assistance with all mobility  She also states he has trouble swallowing and has been on a pureed diet with nectar thick liquids since he has been back

## 2021-02-21 NOTE — ASSESSMENT & PLAN NOTE
-rate is controlled on home metoprolol  -home Eliquis is held  DVT prophylaxis started today with subcutaneous heparin   -I spoke with the patient's grandson, Radhames Stone regarding risks and benefits of anticoagulation given the patient's age and fall risk in the setting of his recent decline  Day but verbalizes understanding at this point time agrees with continuing to hold until he talks further with his brother who is the patient's POA as well as the patient's cardiologist and family doctor  I explained will continue to hold until his hemoglobin is stable, rechecking in the morning and that I will touch base with them by phone tomorrow on 02/22/2021

## 2021-02-21 NOTE — UTILIZATION REVIEW
Initial Clinical Review    Admission: Date/Time/Statement:   Admission Orders (From admission, onward)     Ordered        02/20/21 1611  INPATIENT ADMISSION  Once                   Orders Placed This Encounter   Procedures    INPATIENT ADMISSION     Standing Status:   Standing     Number of Occurrences:   1     Order Specific Question:   Level of Care     Answer:   Med Surg [16]     Order Specific Question:   Bed Type     Answer:   Trauma [7]     Order Specific Question:   Estimated length of stay     Answer:   More than 2 Midnights     Order Specific Question:   Certification     Answer:   I certify that inpatient services are medically necessary for this patient for a duration of greater than two midnights  See H&P and MD Progress Notes for additional information about the patient's course of treatment  ED Arrival Information     Expected Arrival Acuity Means of Arrival Escorted By Service Admission Type    2/20/2021 2/20/2021 15:06 [facility]gent [facility] Trauma Emergency    Arrival Complaint    thoracic fracture        Chief Complaint   Patient presents with    Trauma     trauma transfer from [address] - fall on 2/17/21 with head strike (chronic head bleed on CT b/l subdural hygroma left greater than right)  T2 comp fx acute and T1 and T3 chronic fx  pt alert but confused (GCS 14)  right rib fx in January  Hx of AFib  Assessment/Plan:  79 y/o male with PMhx of CAD, Afib on eliquis, kyphosis, prior CABG who presents to [facility] as a transfer from [address] where he initially presented after a fall  Pt had a fall from standing on 2/17 and found to have a T12 fx as well as change in mental status  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 and chronic T1 & T3 fx's  T-spine tenderness on exam with significant kyphosis  GCS 14, confusion    Admit inpatient to M/S unit under trauma service with Mechanical fall on 5/62 complicated with acute T2 fx and change in mental status GCS 14, Chronic B/l subdural hygromas, L>R, Age indeterminate compression fx T4 w central stenosis, Chronic T1& T3 vertebral fx  Telem monitoring  Neuro checks q4h  Hold eliquis  Resume home meds  Geriatrics consult to avoid polypharmacy  Cr: 1 4 up from baseline 1 1  started isolyte @ 75cc/h for 24h  PT/OT  Pain control  NSx consulted  NSx consult 2/21 -- neuro checks q4h   Unlikely to tolerate any bracing  Obtain upright x-rays for comparison  Mobilize with PT/OT  DVT ppx: SCDs, HSC      2/21 -- pt has been falling frequently over the past month and requiring more assistance at his assisted liviing facility since being hospitalized with covid infection [facility] month  Facility also states he has trouble swallowing and has been on a pureed diet with nectar thick liquids since he has been back  creatinine returned to baseline  D/c IVF's  Encourage po intake  Speech therapy to confirm diet  Continue to hold Eliquis  Ok to start Hep sc for DVT ppx        ED Triage Vitals [02/20/21 1515]   Temperature Pulse Respirations Blood Pressure SpO2   (!) 97 2 °F (36 2 °C) 78 18 129/71 96 %      Temp Source Heart Rate Source Patient Position - Orthostatic VS BP Location FiO2 (%)   Axillary Monitor Lying Right arm --      Pain Score       Worst Possible Pain          Wt Readings from Last 1 Encounters:   02/20/21 49 9 kg (110 lb)     Additional Vital Signs:   Date/Time  Temp  Pulse  Resp  BP  MAP (mmHg)  SpO2  O2 Device   02/21/21 09:54:19  98 3 °F (36 8 °C)  71  16  93/54  67  --  --   02/21/21 0900  --  --  --  --  --  --  None (Room air)   02/21/21 03:03:22  --  --  --  134/77  96  --  --   02/20/21 22:31:05  97 6 °F (36 4 °C)  59  16  129/77  94  96 %  --   02/20/21 21:04:01  --  45Abnormal   --  107/73  84  94 %  --   02/20/21 19:31:47  97 4 °F (36 3 °C)Abnormal   63  16  109/71  84  98 %  --   02/20/21 17:10:26  98 3 °F (36 8 °C)  74  16  143/96  112  96 %  None (Room air)   02/20/21 16:00:39  --  74  20  116/79  --  99 %  -- 02/20/21 15:45:36  --  74  18  127/73  --  --  --   02/20/21 15:30:08  --  75  18  116/77  --  99 %  --   02/20/21 15:19:24  --  --  --  --  --  99 %  --   02/20/21 1515  97 2 °F (36 2 °C)Abnormal   78  18  129/71  --  96 %  None (Room air)       Pertinent Labs/Diagnostic Test Results:   · CT head 2/21/2021: Limited examination due to patient positioning  2   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   No acute intracranial abnormality   3   Chronic appearing bilateral subdural hygromas, left side larger than right  · CT cervical spine 2/20/2021: Somewhat limited examination due to patient positioning and body habitus  2   Severe, age-indeterminate compression fracture involving both the superior and inferior endplates of T4 with near vertebral plana   There is mild bony retropulsion, resulting in mild central stenosis  2   Mild compression fracture superior endplate T2 which appears acute  3   Mild chronic appearing compression fractures involving the superior endplates of T1 and T3 vertebral bodies   4   Moderate degenerative changes of the cervical spine   No cervical spine fracture or traumatic malalignment       EKG 2/20 -- Atrial fibrillation    Results from last 7 days   Lab Units 02/21/21  0217 02/20/21  1114   WBC Thousand/uL 3 69* 5 13   HEMOGLOBIN g/dL 10 7* 12 1   HEMATOCRIT % 33 8* 37 7   PLATELETS Thousands/uL 53* 59*   NEUTROS ABS Thousands/µL  --  3 75     Results from last 7 days   Lab Units 02/21/21  1003 02/20/21  1854 02/20/21  1114   SODIUM mmol/L 146* 143 143   POTASSIUM mmol/L 3 9 4 7 3 8   CHLORIDE mmol/L 108 107 106   CO2 mmol/L 32 32 34*   ANION GAP mmol/L 6 4 3*   BUN mg/dL 29* 28* 28*   CREATININE mg/dL 1 18 1 24 1 46*   EGFR ml/min/1 73sq m 53 50 41   CALCIUM mg/dL 8 3 8 1* 8 0*   MAGNESIUM mg/dL  --  2 3  --    PHOSPHORUS mg/dL  --  3 3  --      Results from last 7 days   Lab Units 02/21/21  1210   POC GLUCOSE mg/dl 131     Results from last 7 days   Lab Units 02/21/21  1003 02/20/21  1854 02/20/21  1114   GLUCOSE RANDOM mg/dL 85 103 102       Results from last 7 days   Lab Units 02/20/21  1114   TROPONIN I ng/mL <0 02     Results from last 7 days   Lab Units 02/21/21  0137 02/20/21  1114   PROTIME seconds 13 2 13 8   INR  1 00 1 05   PTT seconds  --  32          Past Medical History:   Diagnosis Date    Anemia     Arthritis     Atrial fibrillation (HCC)     CAD (coronary artery disease)     Cancer (HCC)     prostate    Diverticulosis     Kyphosis     UTI (urinary tract infection)      Present on Admission:   Postural kyphosis of cervicothoracic region   Closed T2 fracture (HCC)   Subdural hygroma      Admitting Diagnosis: Other closed fracture of second thoracic vertebra, initial encounter ([facility] [address] ) [S22 028A]  Age/Sex: 80 y o  male  Admission Orders:  Scheduled Medications:  acetaminophen, 975 mg, Oral, Q8H [address]  atorvastatin, 10 mg, Oral, HS  cholecalciferol, 1,000 Units, Oral, Daily  eplerenone, 25 mg, Oral, Daily  [START ON 2/22/2021] ferrous sulfate, 325 mg, Oral, Daily With Breakfast  furosemide, 40 mg, Oral, Daily  gabapentin, 100 mg, Oral, HS  heparin (porcine), 5,000 Units, Subcutaneous, Q8H MIKEY  levothyroxine, 75 mcg, Oral, Early Morning  lidocaine, 2 patch, Topical, Daily  metoprolol succinate, 25 mg, Oral, HS  pantoprazole, 40 mg, Oral, Daily  [START ON 2/22/2021] sertraline, 25 mg, Oral, Daily  tamsulosin, 0 4 mg, Oral, Daily With Dinner      PRN Meds:  ALPRAZolam, 0 5 mg, Oral, HS PRN  oxyCODONE, 2 5 mg, Oral, Q4H PRN  oxyCODONE, 5 mg, Oral, Q4H PRN 2/20 x2        Network Utilization Review Department  ATTENTION: Please call with any questions or concerns to [phone] and carefully listen to the prompts so that you are directed to the right person   All voicemails are confidential   Taylor Burgess all requests for admission clinical reviews, approved or denied determinations and any other requests to dedicated fax number below belonging to the [facility] where the patient is receiving treatment   List of dedicated fax numbers for the Facilities:  [address] [place] [address] DENIALS (Administrative/Medical Necessity) [phone]   [address] (Maternity/NICU/Pediatrics) [phone] [address] [place] [address] Dr Kinga Torres [phone] (  [facility] W[facility]) [address] [address] Jazmine Zuniga [address] [place]) [address] [place] [address] [phone]

## 2021-02-21 NOTE — WOUND OSTOMY CARE
Consult Note - Wound   Bulmaro Singh 80 y o  male MRN: [id number]  Unit/Bed#: [facility] 623-01 Encounter: [id number]      History and Present Illness:  Patient is an 80year old male admitted w/p fall with acute fracture ofn T 2  Patient present with significant kyphosis, does complaint of significant back pain, he seem to prerefer laying sideways he is agreeable for assessment although turning is uncomfortable due to pain  Primary RN at bed side during assessment  BMI: Estimated body mass index is 17 75 kg/m² as calculated from the following:    Height as of this encounter: 5' 6" (1 676 m)  Weight as of this encounter: 49 9 kg (110 lb)      History  Past Medical History:   Diagnosis Date    Anemia     Arthritis     Atrial fibrillation (HCC)     CAD (coronary artery disease)     Cancer (HCC)     prostate    Diverticulosis     Kyphosis     UTI (urinary tract infection)      Past Surgical History:   Procedure Laterality Date    ADENOIDECTOMY      CORONARY ARTERY BYPASS GRAFT      INGUINAL HERNIA REPAIR      [place] ESOPHAGOGASTRODUODENOSCOPY TRANSORAL DIAGNOSTIC N/A 9/5/2018    Procedure: EGD AND COLONOSCOPY;  Surgeon: Allen Messer DO;  Location:  MAIN OR;  Service: Gastroenterology    TONSILLECTOMY      TRANSURETHRAL RESECTION OF PROSTATE         Problem List:   Patient Active Problem List   Diagnosis    Postural kyphosis of cervicothoracic region    Closed T2 fracture ([address] )    Subdural hygroma       Medications:  Current Facility-Administered Medications   Medication Dose Route Frequency Provider Last Rate Last Admin    acetaminophen (TYLENOL) tablet 650 mg  650 mg Oral Q6H MIKEY Hayde Walsh MD   650 mg at 02/21/21 0636    ALPRAZolam Florestine Shameka) tablet 0 25 mg  0 25 mg Oral HS PRN Hayde Walsh MD        atorvastatin (LIPITOR) tablet 10 mg  10 mg Oral HS Hayde Walsh MD   10 mg at 02/20/21 2111    eplerenone (INSPRA) tablet 25 mg  25 mg Oral Daily Hayde Walsh MD        finasteride (PROSCAR) tablet 5 mg  5 mg Oral Daily Malinda Gooden MD   5 mg at 02/21/21 0950    furosemide (LASIX) tablet 40 mg  40 mg Oral Daily Malinda Gooden MD        gabapentin (NEURONTIN) capsule 100 mg  100 mg Oral TID Malinda Gooden MD   100 mg at 02/21/21 0951    heparin (porcine) subcutaneous injection 5,000 Units  5,000 Units Subcutaneous [facility] Tracy Jara PA-C        levothyroxine tablet 75 mcg  75 mcg Oral Early Morning Malinda Gooden MD   75 mcg at 02/21/21 0636    lidocaine (LIDODERM) 5 % patch 2 patch  2 patch Topical Daily Malinda Gooden MD   2 patch at 02/20/21 1824    metoprolol succinate (TOPROL-XL) 24 hr tablet 25 mg  25 mg Oral HS Malinda Gooden MD        multi-electrolyte (PLASMALYTE-A/ISOLYTE-S PH 7 4) IV solution  75 mL/hr Intravenous Continuous Malinda Gooden MD   Stopped at 02/21/21 0630    ondansetron (ZOFRAN) injection 4 mg  4 mg Intravenous Q6H PRN Malinda Gooden MD        oxyCODONE (ROXICODONE) IR tablet 2 5 mg  2 5 mg Oral Q4H PRN Malinda Gooden MD        oxyCODONE (ROXICODONE) IR tablet 5 mg  5 mg Oral Q4H PRN Malinda Gooden MD   5 mg at 02/21/21 0955    pantoprazole (PROTONIX) EC tablet 40 mg  40 mg Oral Daily Malinda Gooden MD   40 mg at 02/21/21 0950    potassium chloride (K-DUR,KLOR-CON) CR tablet 20 mEq  20 mEq Oral Daily Malinda Gooden MD   20 mEq at 02/21/21 0950    sertraline (ZOLOFT) tablet 50 mg  50 mg Oral Daily Malinda Gooden MD   50 mg at 02/21/21 0951    tamsulosin (FLOMAX) capsule 0 4 mg  0 4 mg Oral Daily With Rosalind Lloyd MD   0 4 mg at 02/20/21 1824       Assessment Findings:   1-Mid sacrum with healing stage 2 pressure injury  Wound presenting as two resurfacing wounds with blanching pink periwound  Heel and buttocks are intact, no other skin issues seen  Patient is very cachectic with low mobility, protective Allevyn foams are applied to bony prominences      Skin care plans:  1-Hydraguard to bilateral sacrum, buttock and heels TID and PRN  2-Elevate heels to offload pressure  3-Ehob cushion in chair when out of bed  4-Moisturize skin daily with skin nourishing cream   5-Turn/reposition q2h or when medically stable for pressure re-distribution on skin  Vitals: Blood pressure 93/54, pulse 71, temperature 98 3 °F (36 8 °C), temperature source Oral, resp  rate 16, height 5' 6" (1 676 m), weight 49 9 kg (110 lb), SpO2 96 %  ,Body mass index is 17 75 kg/m²  Wound 02/20/21 Pressure Injury Sacrum Mid (Active)   Wound Image    02/21/21 1110   Wound Description Fragile;Pink 02/20/21 1710   Pressure Injury Stage 2 02/21/21 1110   Winifred-wound Assessment Intact; Erythema 02/21/21 1110   Wound Length (cm) 1 cm 02/21/21 1110   Wound Width (cm) 1 cm 02/21/21 1110   Wound Depth (cm) 0 01 cm 02/21/21 1110   Wound Surface Area (cm^2) 1 cm^2 02/21/21 1110   Wound Volume (cm^3) 0 01 cm^3 02/21/21 1110   Calculated Wound Volume (cm^3) 0 01 cm^3 02/21/21 1110   Drainage Amount None 02/20/21 1710   Treatments Site care 02/21/21 1110   Dressing Foam, Silicon (eg  Allevyn, etc) 02/20/21 1710   Patient Tolerance Tolerated well 02/21/21 1110   Dressing Status Clean;Dry; Intact 02/20/21 1710       Our skin care recommendations are placed as nursing orders, wound care to continue following, please tiger text team with questions and concerns          Shamar Beyer, RN, BSN, [facility]

## 2021-02-21 NOTE — PHYSICAL THERAPY NOTE
Physical Therapy Evaluation    Patient's Name: Haider Jolley    Admitting Diagnosis  Other closed fracture of second thoracic vertebra, initial encounter [facility]) [S22 028A]    Problem List  Patient Active Problem List   Diagnosis    Postural kyphosis of cervicothoracic region    Closed T2 fracture ([facility] [address] )    Subdural hygroma    Fall    Acute kidney injury ([facility] [address] )    Sacral wound    Dysphagia    Atrial fibrillation ([facility] [address] )    Anemia       Past Medical History  Past Medical History:   Diagnosis Date    Anemia     Arthritis     Atrial fibrillation ([facility] [address] )     CAD (coronary artery disease)     Cancer ([facility] [address] )     prostate    Diverticulosis     Kyphosis     UTI (urinary tract infection)        Past Surgical History  Past Surgical History:   Procedure Laterality Date    ADENOIDECTOMY      CORONARY ARTERY BYPASS GRAFT      INGUINAL HERNIA REPAIR      [place] ESOPHAGOGASTRODUODENOSCOPY TRANSORAL DIAGNOSTIC N/A 9/5/2018    Procedure: EGD AND COLONOSCOPY;  Surgeon: Ruba Villalobos DO;  Location:  MAIN OR;  Service: Gastroenterology    TONSILLECTOMY      TRANSURETHRAL RESECTION OF PROSTATE          02/21/21 1026   PT Last Visit   PT Visit Date 02/21/21   Note Type   Note type Evaluation   Pain Assessment   Pain Assessment Tool FLACC   Pain Location/Orientation Location: Back   Pain Rating: FLACC (Rest) - Face 0   Pain Rating: FLACC (Rest) - Legs 0   Pain Rating: FLACC (Rest) - Activity 0   Pain Rating: FLACC (Rest) - Cry 0   Pain Rating: FLACC (Rest) - Consolability 0   Score: FLACC (Rest) 0   Pain Rating: FLACC (Activity) - Face 1   Pain Rating: FLACC (Activity) - Legs 0   Pain Rating: FLACC (Activity) - Activity 0   Pain Rating: FLACC (Activity) - Cry 1   Pain Rating: FLACC (Activity) - Consolability 1   Score: FLACC (Activity) 3   Home Living   Additional Comments Pt poor historian, unable to provide home setup or PLOF  Per EMR, pt resides at [facility] Function   Level of [place] Needs assistance with ADLs and functional mobility; Needs assistance with IADLs   Lives With Facility staff   Receives Help From Other (Comment)  (facility staff)   ADL Assistance Needs assistance   IADLs Needs assistance   Vocational Retired   Restrictions/Precautions   Wells [place]her Burn Bearing Precautions Per Order No   Braces or Orthoses Other (Comment)  (per neurosurg- no brace at this time)   Other Precautions Cognitive; Chair Alarm; Bed Alarm; Fall Risk;Pain;Telemetry   Cognition   Overall Cognitive Status Impaired   Attention Attends with cues to redirect   Orientation Level Oriented to person;Oriented to place; Disoriented to time;Disoriented to situation   Memory Decreased recall of precautions;Decreased recall of recent events   Following Commands Follows one step commands with increased time or repetition   Comments Pt participation in session limited by pain and fatigue  RLE Assessment   RLE Assessment X  (unable to formally assess 2* cog, generalized weakness)   LLE Assessment   LLE Assessment X  (unable to formally assess 2* cog, generalized weakness)   Bed Mobility   Supine to Sit 3  Moderate assistance   Additional items Assist x 2; Increased time required;Verbal cues;LE management   Sit to Supine 3  Moderate assistance   Additional items Assist x 2; Increased time required;Verbal cues;LE management   Additional Comments Pt required Philip for trunk control at EOB   Transfers   Sit to Stand 3  Moderate assistance   Additional items Assist x 2; Increased time required;Verbal cues   Stand to Sit 3  Moderate assistance   Additional items Assist x 2; Increased time required;Verbal cues   Additional Comments Transfers with HHAx2  Pt extremely kyphotic, only able to tolerate standing approx   10s   Ambulation/Elevation   Distance unable to assess due to pt's limited standing tolerance; also unclear PLOF   Balance   Static Sitting Poor +   Dynamic Sitting Poor +   Static Standing Poor +   Activity Tolerance   Activity Tolerance Patient limited by fatigue   Medical Staff Made Aware OT [place]   Nurse Made Aware pt okay to see per RN   Assessment   Prognosis Guarded   Problem List Decreased strength;Decreased range of motion;Decreased endurance; Impaired balance;Decreased mobility; Decreased cognition;Pain   Assessment Pt is a 80 y o  male seen for PT evaluation s/p admit to [facility] on 2/20/2021  Pt was admitted with a primary dx of: closed T2 fracture s/p fall  No neurosurgical intervention or bracing required at this time  PT now consulted for assessment of mobility and d/c needs  Pt with Up in chair, active PT eval orders  Pts current comorbidities effecting treatment include: h/o Anemia, Arthritis, Atrial fibrillation ([facility] [address] ), CAD (coronary artery disease), Cancer ([facility] [address] ), Diverticulosis, Kyphosis, and UTI (urinary tract infection)  Pts current clinical presentation is Unstable/ Unpredictable (high complexity) due to Ongoing medical management for primary dx, Increased reliance on more restrictive AD compared to baseline, Decreased activity tolerance compared to baseline, Fall risk, Increased assistance needed from caregiver at current time, Ongoing telemetry monitoring, Cog status  Pt poor historian, unable to obtain PLOF or home setup  Per EMR, pt resides at [facility]; will clarify further with CM  Upon evaluation, pt currently is requiring modAx2 for bed mobility and modAx2 for transfers with HHA  Pt only able to tolerate standing <10s with extremely kyphotic posture  Pt presents at PT eval functioning below baseline and currently w/ overall mobility deficits 2* to: BLE weakness, impaired balance, decreased endurance, pain, decreased activity tolerance compared to baseline, decreased functional mobility tolerance compared to baseline, fall risk, decreased cognition  Pt currently at a fall risk 2* to impairments listed above    Pt will continue to benefit from skilled acute PT interventions to address stated impairments; to maximize functional mobility; for ongoing pt/ family training; and DME needs  At conclusion of PT session pt returned BTB, bed alarm engaged and RN notified of session findings/recommendations with phone and call bell within reach  Pt denies any further questions at this time  The patient's [facility] Basic Mobility Inpatient Short Form Raw Score is 10, Standardized Score is 25 72   A standardized score less than 42 9 suggests the patient may benefit from discharge to post-acute rehabilitation services  Please also refer to the recommendation of the Physical Therapist for safe discharge planning  Recommend return to facility with increased A pending clarification of PLOF upon hospital D/C  Goals   Patient Goals to go back to bed   STG Expiration Date 03/07/21   Short Term Goal #1 In 10-14 days pt will be able to: 1  Demonstrate ability to perform all aspects of bed mobility with Philip to increase functional independence  2  Perform functional transfers with Philip to facilitate safe return to previous living environment  3  Improve LE strength grades by 1 to increase ease of functional mobility with transfers and gait  4  Pt will demonstrate improved balance by one grade order to decrease risk of falls  PT to see for gait and stair goals when/if appropriate (pending clarification of PLOF)   PT Treatment Day 0   Plan   Treatment/Interventions Functional transfer training;LE strengthening/ROM; Elevations; Therapeutic exercise; Endurance training;Bed mobility;Gait training;Spoke to nursing;OT   PT Frequency 2-3x/wk   Recommendation   PT Discharge Recommendation Return to previous environment with social support  (return to facility with increased assist pending PLOF)   [facility] Basic Mobility Inpatient   Turning in Bed Without Bedrails 3   Lying on Back to Sitting on Edge of Flat Bed 2   Moving Bed to Chair 1   Standing Up From Chair 1   Walk in Room 1   Climb 3-5 Stairs 1   Basic Mobility Inpatient Raw Score 9   Turning Head Towards Sound 4   Follow Simple Instructions 3   Low Function Basic Mobility Raw Score 16   Low Function Basic Mobility Standardized Score 25 72   Modified [place] Scale   Modified [place] Scale 4       Berl Distance, PT, DPT

## 2021-02-22 VITALS
TEMPERATURE: 97.9 F | SYSTOLIC BLOOD PRESSURE: 133 MMHG | HEIGHT: 66 IN | RESPIRATION RATE: 17 BRPM | HEART RATE: 69 BPM | OXYGEN SATURATION: 96 % | DIASTOLIC BLOOD PRESSURE: 66 MMHG | WEIGHT: 110 LBS | BODY MASS INDEX: 17.68 KG/M2

## 2021-02-22 LAB
ANION GAP SERPL CALCULATED.3IONS-SCNC: 5 MMOL/L (ref 4–13)
BASOPHILS # BLD AUTO: 0.01 THOUSANDS/ΜL (ref 0–0.1)
BASOPHILS NFR BLD AUTO: 0 % (ref 0–1)
BUN SERPL-MCNC: 30 MG/DL (ref 5–25)
CALCIUM SERPL-MCNC: 8.5 MG/DL (ref 8.3–10.1)
CHLORIDE SERPL-SCNC: 108 MMOL/L (ref 100–108)
CO2 SERPL-SCNC: 32 MMOL/L (ref 21–32)
CREAT SERPL-MCNC: 1.26 MG/DL (ref 0.6–1.3)
EOSINOPHIL # BLD AUTO: 0.04 THOUSAND/ΜL (ref 0–0.61)
EOSINOPHIL NFR BLD AUTO: 1 % (ref 0–6)
ERYTHROCYTE [DISTWIDTH] IN BLOOD BY AUTOMATED COUNT: 15.7 % (ref 11.6–15.1)
GFR SERPL CREATININE-BSD FRML MDRD: 49 ML/MIN/1.73SQ M
GLUCOSE SERPL-MCNC: 88 MG/DL (ref 65–140)
HCT VFR BLD AUTO: 37.8 % (ref 36.5–49.3)
HGB BLD-MCNC: 11.8 G/DL (ref 12–17)
IMM GRANULOCYTES # BLD AUTO: 0.01 THOUSAND/UL (ref 0–0.2)
IMM GRANULOCYTES NFR BLD AUTO: 0 % (ref 0–2)
LYMPHOCYTES # BLD AUTO: 1.19 THOUSANDS/ΜL (ref 0.6–4.47)
LYMPHOCYTES NFR BLD AUTO: 32 % (ref 14–44)
MCH RBC QN AUTO: 33.8 PG (ref 26.8–34.3)
MCHC RBC AUTO-ENTMCNC: 31.2 G/DL (ref 31.4–37.4)
MCV RBC AUTO: 108 FL (ref 82–98)
MONOCYTES # BLD AUTO: 0.18 THOUSAND/ΜL (ref 0.17–1.22)
MONOCYTES NFR BLD AUTO: 5 % (ref 4–12)
NEUTROPHILS # BLD AUTO: 2.26 THOUSANDS/ΜL (ref 1.85–7.62)
NEUTS SEG NFR BLD AUTO: 62 % (ref 43–75)
NRBC BLD AUTO-RTO: 0 /100 WBCS
PLATELET # BLD AUTO: 63 THOUSANDS/UL (ref 149–390)
PMV BLD AUTO: 10.3 FL (ref 8.9–12.7)
POTASSIUM SERPL-SCNC: 3.8 MMOL/L (ref 3.5–5.3)
RBC # BLD AUTO: 3.49 MILLION/UL (ref 3.88–5.62)
SODIUM SERPL-SCNC: 145 MMOL/L (ref 136–145)
WBC # BLD AUTO: 3.69 THOUSAND/UL (ref 4.31–10.16)

## 2021-02-22 PROCEDURE — 85025 COMPLETE CBC W/AUTO DIFF WBC: CPT | Performed by: PHYSICIAN ASSISTANT

## 2021-02-22 PROCEDURE — NC001 PR NO CHARGE: Performed by: SURGERY

## 2021-02-22 PROCEDURE — 80048 BASIC METABOLIC PNL TOTAL CA: CPT | Performed by: PHYSICIAN ASSISTANT

## 2021-02-22 PROCEDURE — 99238 HOSP IP/OBS DSCHRG MGMT 30/<: CPT | Performed by: SURGERY

## 2021-02-22 PROCEDURE — 99232 SBSQ HOSP IP/OBS MODERATE 35: CPT | Performed by: PHYSICIAN ASSISTANT

## 2021-02-22 PROCEDURE — NC001 PR NO CHARGE: Performed by: INTERNAL MEDICINE

## 2021-02-22 RX ORDER — GABAPENTIN 100 MG/1
100 CAPSULE ORAL
Refills: 0
Start: 2021-02-22

## 2021-02-22 RX ORDER — OXYCODONE HYDROCHLORIDE 5 MG/1
TABLET ORAL
Qty: 15 TABLET | Refills: 0 | Status: SHIPPED | OUTPATIENT
Start: 2021-02-22

## 2021-02-22 RX ORDER — OXYCODONE HYDROCHLORIDE 5 MG/1
TABLET ORAL
Qty: 15 TABLET | Refills: 0 | Status: SHIPPED | OUTPATIENT
Start: 2021-02-22 | End: 2021-02-22

## 2021-02-22 RX ADMIN — SERTRALINE HYDROCHLORIDE 25 MG: 25 TABLET ORAL at 09:51

## 2021-02-22 RX ADMIN — HEPARIN SODIUM 5000 UNITS: 5000 INJECTION INTRAVENOUS; SUBCUTANEOUS at 05:15

## 2021-02-22 RX ADMIN — OXYCODONE HYDROCHLORIDE 5 MG: 5 TABLET ORAL at 09:50

## 2021-02-22 RX ADMIN — LEVOTHYROXINE SODIUM 75 MCG: 75 TABLET ORAL at 05:16

## 2021-02-22 RX ADMIN — FERROUS SULFATE TAB 325 MG (65 MG ELEMENTAL FE) 325 MG: 325 (65 FE) TAB at 09:51

## 2021-02-22 RX ADMIN — HEPARIN SODIUM 5000 UNITS: 5000 INJECTION INTRAVENOUS; SUBCUTANEOUS at 13:04

## 2021-02-22 RX ADMIN — ACETAMINOPHEN 975 MG: 325 TABLET ORAL at 13:04

## 2021-02-22 RX ADMIN — PANTOPRAZOLE SODIUM 40 MG: 40 TABLET, DELAYED RELEASE ORAL at 09:51

## 2021-02-22 RX ADMIN — ACETAMINOPHEN 975 MG: 325 TABLET ORAL at 05:16

## 2021-02-22 RX ADMIN — FUROSEMIDE 40 MG: 40 TABLET ORAL at 09:50

## 2021-02-22 RX ADMIN — Medication 1000 UNITS: at 09:51

## 2021-02-22 RX ADMIN — EPLERENONE 25 MG: 25 TABLET, FILM COATED ORAL at 09:51

## 2021-02-22 NOTE — PROGRESS NOTES
Progress Note - Puma Martinez 6/30/1927, 80 y o  male MRN: [id number]    Unit/Bed#: [facility] 623-01 Encounter: [id number]    Primary Care Provider: Barby Saez MD   Date and time admitted to hospital: 2/20/2021  3:06 PM        * Closed T2 fracture [facility])  Assessment & Plan  Acute T2 SEP compression fracture, chronic fractures of T1, T3, T4   · S/p fall at home on 2/17, presented to hospital as trauma on 2/20  On Eliquis for Afib  · Severe kyphosis at baseline  · Ambulates at home with walker  · GCS 14 (confusion, very [place])  Moving all extremities  No weakness  Imaging:  · Xray thoracic spine, 2/21/21: Multiple age-indeterminate thoracic compression deformities with resultant hyperkyphosis  No acute-appearing subluxation  Plan:  · Frequent neuro checks  · Unlikely to tolerate any bracing  · Xrays reviewed - no worsening compression/kyphosis  · Mobilize with PT/OT  · Pain control per primary team  · DVT ppx: [place]CDs, [facility]  Neurosurgery will sign off at this time  Patient does not need any outpatient follow up or additional imaging for T2 fracture as he is not getting braced  Please call with any questions or concerns  Subdural hygroma  Assessment & Plan  S/p fall at home on Eliquis on 2/17  Risk versus benefit discussion regarding Eliquis in this 80year old patient with frequent falls  No repeat imaging or follow up needed  Imaging:  · CT head 2/21/2021: Limited examination due to patient positioning  2   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  No acute intracranial abnormality  3   Chronic appearing bilateral subdural hygromas, left side larger than right  Subjective/Objective   Chief Complaint: "Can you get me a new back?"    Subjective: Patient with NAEO  C/o back pain but no BLE weakness or pain, no UI/BI/saddle anesthesia  Denies HA, confusion, speech difficulty, or other worsening neurological symptoms      Objective: Patient laying in bed in NAD  VSS  Intake/Output       02/22/21 0701 - 02/23/21 0700      [id number] [id number] Total       Intake    P O   200  -- 200    Total Intake 200 -- 200       Output    Urine  275  -- 275    Output (mL) (External Urinary Catheter Small) 275 -- 275    Total Output 275 -- 275       Net I/O     -75 -- -75          Invasive Devices     Peripheral Intravenous Line            Peripheral IV 02/20/21 Distal;Left;Upper;Ventral (anterior) Arm 1 day          Drain            External Urinary Catheter Small less than 1 day                Vitals: Blood pressure 133/66, pulse 69, temperature 97 9 °F (36 6 °C), resp  rate 17, height 5' 6" (1 676 m), weight 49 9 kg (110 lb), SpO2 96 %  ,Body mass index is 17 75 kg/m²  General appearance: alert, appears stated age, cooperative and no distress  Frail appearing  Head: Normocephalic, without obvious abnormality, atraumatic  Eyes: EOMI, PERRL  Back: severe kyphosis, TTP midline upper thoracic spine  Lungs: non labored breathing  Heart: regular heart rate  Neurologic:   Mental status: Alert, oriented x3, thought content appropriate, speech clear and fluent  Extremely [place] bilaterally, is not currently wearing hearing aids  Cranial nerves: grossly intact (Cranial nerves II-XII)  Sensory: normal to LT  Motor: moving all extremities without focal weakness   Reflexes: 2+ and symmetric, no hoffmans or clonus  Coordination: finger to nose normal bilaterally, no drift bilaterally    Lab Results: I have personally reviewed pertinent results        Results from last 7 days   Lab Units 02/22/21  0542 02/21/21  0217 02/20/21  1114   WBC Thousand/uL 3 69* 3 69* 5 13   HEMOGLOBIN g/dL 11 8* 10 7* 12 1   HEMATOCRIT % 37 8 33 8* 37 7   PLATELETS Thousands/uL 63* 53* 59*   NEUTROS PCT % 62  --  73   MONOS PCT % 5  --  5     Results from last 7 days   Lab Units 02/22/21  0542 02/21/21  1003 02/20/21  1854   POTASSIUM mmol/L 3 8 3 9 4 7   CHLORIDE mmol/L 108 108 107   CO2 mmol/L 32 32 32 BUN mg/dL 30* 29* 28*   CREATININE mg/dL 1 26 1 18 1 24   CALCIUM mg/dL 8 5 8 3 8 1*     Results from last 7 days   Lab Units 02/20/21  1854   MAGNESIUM mg/dL 2 3     Results from last 7 days   Lab Units 02/20/21  1854   PHOSPHORUS mg/dL 3 3     Results from last 7 days   Lab Units 02/21/21  0137 02/20/21  1114   INR  1 00 1 05   PTT seconds  --  32     No results found for: TROPONINT  ABG:No results found for: PHART, TCK8FMX, PO2ART, ZRU4FFW, T9GPBLWN, BEART, SOURCE    Imaging Studies: I have personally reviewed pertinent reports  and I have personally reviewed pertinent films in PACS     Xr Trauma Chest Portable    Result Date: 2/20/2021  Narrative: CHEST INDICATION:   TRAUMA  COMPARISON:  None EXAM PERFORMED/VIEWS:  XR CHEST PORTABLE FINDINGS:  Median sternotomy wires are present  Cardiomediastinal silhouette appears unremarkable  Linear atelectasis is present within the right midlung  No pneumothorax or pleural effusion  Osseous structures appear within normal limits for patient age  Impression: Right midlung linear atelectasis  Workstation performed: [id number]     Xr Spine Thoracic 3 Vw    Result Date: 2/22/2021  Narrative: THORACIC SPINE INDICATION:   Multiple thoracic compression fractures  COMPARISON:  Cervical spine CT and chest radiographs dated 2/20/2021  VIEWS:  XR SPINE THORACIC 3 VW FINDINGS: Thoracic hyperkyphosis without subluxation  There are numerous anterior compression deformities in the thoracic spine ranging from mild to severe  Estimate levels of greatest involvement at T4 and T11, though numbering of vertebral bodies is difficult due to obscuration of landmarks  Osteopenia otherwise degrades evaluation of mineralization  Pedicles are not well assessed  No significant degenerative disc disease  Median sternotomy wires are present  Prior CABG  Low lung volumes  Bilateral pleural effusions  Bandlike opacities at the lung bases and anterior right upper or middle lobe  Configuration most suggestive of atelectasis  Impression: Multiple age-indeterminate thoracic compression deformities with resultant hyperkyphosis  No acute-appearing subluxation  Pulmonary opacities show morphology most suggestive of atelectasis  Small bilateral pleural effusions, as well  Workstation performed: [id number]     Trauma - Ct Head Wo Contrast    Result Date: 2/20/2021  Narrative: CT BRAIN - WITHOUT CONTRAST INDICATION:   TRAUMA  History of fall with head injury February 17, 2021  Increased lethargy and agitation  COMPARISON:  None  TECHNIQUE:  CT examination of the brain was performed  In addition to axial images, sagittal and coronal 2D reformatted images were created and submitted for interpretation  Radiation dose length product (DLP) for this visit:  448 42 mGy-cm   This examination, like all CT scans performed in the [facility], was performed utilizing techniques to minimize radiation dose exposure, including the use of iterative  reconstruction and automated exposure control  IMAGE QUALITY:  The study is somewhat limited due to patient positioning  FINDINGS: PARENCHYMA:  No acute intraparenchymal hemorrhage  No acute infarctions  Areas of decreased attenuation in the periventricular regions and centrum semiovale bilaterally, consistent with chronic small vessel ischemic white matter disease  Bilateral internal carotid artery and vertebral artery calcifications  VENTRICLES AND EXTRA-AXIAL SPACES:  Ventricles and cerebral sulci are moderately dilated  There are low-density extra-axial collections along the cerebral convexities bilaterally, left side larger than right, most compatible with chronic bilateral subdural hygromas VISUALIZED ORBITS AND PARANASAL SINUSES:  No acute abnormality involving the orbits  Bilateral lens surgery  Mild scattered sinus mucosal thickening is noted  No fluid levels are seen   CALVARIUM AND EXTRACRANIAL SOFT TISSUES:  Normal  Impression: 1  Limited examination due to patient positioning  2   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  No acute intracranial abnormality  3   Chronic appearing bilateral subdural hygromas, left side larger than right  The study was marked in [facility] for immediate notification  Workstation performed: [id number]     Trauma - Ct Spine Cervical Wo Contrast    Result Date: 2/20/2021  Narrative: CT CERVICAL SPINE - WITHOUT CONTRAST INDICATION:   TRAUMA  History of fall with head injury February 17, 2021  Increased lethargy and agitation  COMPARISON:  None  TECHNIQUE:  CT examination of the cervical spine was performed without intravenous contrast   Contiguous axial images were obtained  Sagittal and coronal reconstructions were performed  Radiation dose length product (DLP) for this visit:  431 9 mGy-cm   This examination, like all CT scans performed in the [place], was performed utilizing techniques to minimize radiation dose exposure, including the use of iterative reconstruction and automated exposure control  IMAGE QUALITY:  The study is suboptimal due to patient positioning and body habitus  FINDINGS: ALIGNMENT:  There is an increase in the cervical thoracic kyphotic curvature  VERTEBRAL BODIES:  The osseous structures are demineralized  Ununited posterior arch C1, normal variant  There is no acute fracture involving the cervical vertebral bodies  There is slight loss in the axial height of the T1 vertebral body along its superior endplate which appears chronic  There is mild loss in the axial height of the T2 vertebral body along its superior endplate which appears acute  Slight loss in the axial height of the T3 vertebral body along its superior endplate which appears chronic  There is severe compression fracture involving both the superior and inferior endplates of the T4 vertebral body with vertebral plana    There is mild bony retropulsion posteriorly, resulting in mild central stenosis  DEGENERATIVE CHANGES:  Moderate multilevel cervical degenerative changes are noted  No critical central canal stenosis  PREVERTEBRAL AND PARASPINAL SOFT TISSUES:  Unremarkable  THORACIC INLET:  Normal      Impression: 1  Somewhat limited examination due to patient positioning and body habitus  2   Severe, age-indeterminate compression fracture involving both the superior and inferior endplates of T4 with near vertebral plana  There is mild bony retropulsion, resulting in mild central stenosis  2   Mild compression fracture superior endplate T2 which appears acute  3   Mild chronic appearing compression fractures involving the superior endplates of T1 and T3 vertebral bodies  4   Moderate degenerative changes of the cervical spine  No cervical spine fracture or traumatic malalignment  If warranted, consider follow-up MRI of the thoracic spine, for further evaluation  I personally discussed this study with Kayli Jorgensen on 2/20/2021 at 11:56 AM   Workstation performed: [id number]     EKG, Pathology, and Other Studies: I have personally reviewed pertinent reports        VTE Pharmacologic Prophylaxis: Heparin    VTE Mechanical Prophylaxis: sequential compression device

## 2021-02-22 NOTE — ASSESSMENT & PLAN NOTE
-rate is controlled on home metoprolol  -home Eliquis is held  DVT prophylaxis started today with subcutaneous heparin   -I spoke with the patient's family regarding risks and benefits of anticoagulation given the patient's age and fall risk in the setting of his recent decline   Will hold Eliquis for now and recommend f/u with PCP and cardiologist

## 2021-02-22 NOTE — ASSESSMENT & PLAN NOTE
-Compression fx s/p fall  -appreciate neurosurgery evaluation and recommendations  No plans for bracing secondary to severe kyphosis  -Upright xrays show stability, f/u with neurosurgery as needed only  -patient is neuro intact  -PT and OT evaluated the patient recommending discharge back to assisted living with increased support  I spoke with Kimberli Castro where he resides who confirm he has required increased support with all mobility and feeds since he was discharged from only rehab about a week ago after an admission to [facility] for rib fractures

## 2021-02-22 NOTE — ASSESSMENT & PLAN NOTE
-sustaining the below stated injuries  -patient has been falling more frequently over the last month, had a recent fall with rib fractures and was admitted to [facility] and discharged to rehab following admission    -PT and OT evaluated the patient recommend discharge back to assisted living facility with increased support which they can provide  -geriatrics evaluation appreciated

## 2021-02-22 NOTE — CASE MANAGEMENT
Cm spoke to nurse from [facility]  She explained that pt's current disposition is his new baseline after returning from rehab  Pt is cleared to return there today   CM will attempt to secure transportation

## 2021-02-22 NOTE — DISCHARGE INSTRUCTIONS
Seek medical attn if you develop worsening pain, numbness/tingling or weakness of the lower extremities  Take pain medications as prescribed  F/u with family doctor and cardiologist regarding ongoing discussions for risks/benefits of resuming eliquis

## 2021-02-22 NOTE — ASSESSMENT & PLAN NOTE
-Compression fx s/p fall  -appreciate neurosurgery evaluation and recommendations  No plans for bracing secondary to severe kyphosis  -follow-up upright thoraco lumbar spine x-rays  -patient is neuro intact  -PT and OT evaluated the patient recommending discharge back to assisted living with increased support  I spoke with Terrell Alvin where he resides who confirm he has required increased support with all mobility and feeds since he was discharged from only rehab about a week ago after an admission to [facility] for rib fractures

## 2021-02-22 NOTE — ASSESSMENT & PLAN NOTE
-may be related to dilution as patient has no signs of bleeding clinically  Has a dominant oral exam is benign and he is nontender  Chest x-ray was negative on admission    -H/H stable this morning  -this is asymptomatic at this time

## 2021-02-22 NOTE — ASSESSMENT & PLAN NOTE
-Chronic, present on arrival  -appreciate neurosurgery recommendations, no follow-up needed  -continue subcutaneous heparin for DVT prophylaxis

## 2021-02-22 NOTE — DISCHARGE SUMMARY
Discharge- Eloy Correa 6/30/1927, 80 y o  male MRN: [id number]    [facility] Encounter: [id number]    Primary Care Provider: Jose Roberto Chamberlain MD   Date and time admitted to hospital: 2/20/2021  3:06 PM        [address]  -sustaining the below stated injuries  -patient has been falling more frequently over the last month, had a recent fall with rib fractures and was admitted to [facility] and discharged to rehab following admission  -PT and OT evaluated the patient recommend discharge back to assisted living facility with increased support which they can provide  D/C back to [facility] today    -geriatrics evaluation appreciated    * Closed T2 fracture [facility])  Assessment & Plan  -Compression fx s/p fall  -appreciate neurosurgery evaluation and recommendations  No plans for bracing secondary to severe kyphosis  -Upright xrays show stability, f/u with neurosurgery as needed only  -patient is neuro intact  -PT and OT evaluated the patient recommending discharge back to assisted living with increased support  I spoke with Alex Steven where he resides who confirm he has required increased support with all mobility and feeds since he was discharged from only rehab about a week ago after an admission to [facility] for rib fractures  Postural kyphosis of cervicothoracic region  Assessment & Plan  -chronic    Subdural hygroma  Assessment & Plan  -Chronic, present on arrival  -appreciate neurosurgery recommendations, no follow-up needed    Acute kidney injury [facility])  Assessment & Plan  -creatinine 1 4 on admission  Baseline creatinine is 1 1  Patient has returned to baseline with IV fluid hydration   -this is likely related to reduced oral intake secondary to dysphagia  -continue to encourage oral intake    Anemia  Assessment & Plan  -may be related to dilution as patient has no signs of bleeding clinically    Has a dominant oral exam is benign and he is nontender  Chest x-ray was negative on admission  -H/H stable this morning  -this is asymptomatic at this time    Sacral wound  Assessment & Plan  -present on admission  -wound care is following    Dysphagia  Assessment & Plan  -continue pureed diet with honey thickened liquids  Meds crushed in puree  [facility]  Assessment & Plan  -rate is controlled on home metoprolol  -home Eliquis is held  DVT prophylaxis started today with subcutaneous heparin   -I spoke with the patient's family regarding risks and benefits of anticoagulation given the patient's age and fall risk in the setting of his recent decline  Will hold Eliquis for now, on discharge, and recommend f/u with PCP and cardiologist                  Resolved Problems  Date Reviewed: 2/22/2021    None          Admission Date:   Admission Orders (From admission, onward)     Ordered        02/20/21 [address]  Once                     Admitting Diagnosis: Other closed fracture of second thoracic vertebra, initial encounter ([facility] ) [S22 028A]    HPI: As documented by Dr Alma Stone who evaluated the patient on admission, Austyn Neville is a 80 y o  male w PMH of CAD, Afib on eliquis, kyphosis, prior CABG, who presents after a fall from standing on 2/17  Fall was complicated with acute T2 fracture, also with change in mental status  Patient has chronic bilateral subdural hygromas left worse than the right  Also with an age indeterminate compression fracture T4 with central stenosis  Also chronic T1 and T3 vertebral fractures  Patient is sensory motor intact to his lower extremities  Noted T-spine tenderness on exam with significant kyphosis  GCS 14, confusion  Denied any recent fevers, chills, chest pain shortness of breath today "    Procedures Performed: No orders of the defined types were placed in this encounter        Summary of Hospital Course: Patient was placed on the trauma service following fall when he sustained an acute T2 fracture  His pain was controlled  He was seen by neurosurgery who recommended no bracing  He was up and ambulatory with PT who recommended d/c back to assisted living facility with increased support, which the facility confirmed they could provide  Upright xrays showed stability  Neurosurgery signed off and recommended f/u as needed  He was seen by speech therapy and placed on a dysphagia diet  Eliquis is being held on d/c as per discussions with the family until they can have further risk/benefits discussions with the PCP and cardiologist      Significant Findings, Care, Treatment and Services Provided:   Xr Trauma Chest Portable    Result Date: 2/20/2021  Impression: Right midlung linear atelectasis  Workstation performed: [id number]     Xr Spine Thoracic 3 Vw    Result Date: 2/22/2021  Impression: Multiple age-indeterminate thoracic compression deformities with resultant hyperkyphosis  No acute-appearing subluxation  Pulmonary opacities show morphology most suggestive of atelectasis  Small bilateral pleural effusions, as well  Workstation performed: [id number]     Trauma - Ct Head Wo Contrast    Result Date: 2/20/2021  Impression: 1  Limited examination due to patient positioning  2   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  No acute intracranial abnormality  3   Chronic appearing bilateral subdural hygromas, left side larger than right  The study was marked in [facility] for immediate notification  Workstation performed: [id number]     Trauma - Ct Spine Cervical Wo Contrast    Result Date: 2/20/2021  Impression: 1  Somewhat limited examination due to patient positioning and body habitus  2   Severe, age-indeterminate compression fracture involving both the superior and inferior endplates of T4 with near vertebral plana  There is mild bony retropulsion, resulting in mild central stenosis   2   Mild compression fracture superior endplate T2 which appears acute  3   Mild chronic appearing compression fractures involving the superior endplates of T1 and T3 vertebral bodies  4   Moderate degenerative changes of the cervical spine  No cervical spine fracture or traumatic malalignment  If warranted, consider follow-up MRI of the thoracic spine, for further evaluation  I personally discussed this study with Vladislav Palmer on 2/20/2021 at 11:56 AM   Workstation performed: [id number]       Complications: none    Condition at Discharge: good         Discharge instructions/Information to patient and family:   See after visit summary for information provided to patient and family  Provisions for Follow-Up Care:  See after visit summary for information related to follow-up care and any pertinent home health orders  PCP: Kermit Aragon MD    Disposition: Home at [address]    Planned Readmission: No    Discharge Statement   I spent 30 minutes discharging the patient  This time was spent on the day of discharge  I had direct contact with the patient on the day of discharge  Additional documentation is required if more than 30 minutes were spent on discharge  Discharge Medications:  See after visit summary for reconciled discharge medications provided to patient and family

## 2021-02-22 NOTE — CONSULTS
Inpatient consult to Gerontology  Consult performed by: Delene Siemens,   Consult ordered by: Pina Viera MD        Patient discharged before being seen by consulting team  Recommend o/p f/u, thank you!

## 2021-02-22 NOTE — PLAN OF CARE
Problem: Potential for Falls  Goal: Patient will remain free of falls  Description: INTERVENTIONS:  - Assess patient frequently for physical needs  -  Identify cognitive and physical deficits and behaviors that affect risk of falls    -  [place] fall precautions as indicated by assessment   - Educate patient/family on patient safety including physical limitations  - Instruct patient to call for assistance with activity based on assessment  - Modify environment to reduce risk of injury  - Consider OT/PT consult to assist with strengthening/mobility  Outcome: Progressing     Problem: Prexisting or High Potential for Compromised Skin Integrity  Goal: Skin integrity is maintained or improved  Description: INTERVENTIONS:  - Identify patients at risk for skin breakdown  - Assess and monitor skin integrity  - Assess and monitor nutrition and hydration status  - Monitor labs   - Assess for incontinence   - Turn and reposition patient  - Assist with mobility/ambulation  - Relieve pressure over bony prominences  - Avoid friction and shearing  - Provide appropriate hygiene as needed including keeping skin clean and dry  - Evaluate need for skin moisturizer/barrier cream  - Collaborate with interdisciplinary team   - Patient/family teaching  - Consider wound care consult   Outcome: Progressing     Problem: PAIN - ADULT  Goal: Verbalizes/displays adequate comfort level or baseline comfort level  Description: Interventions:  - Encourage patient to monitor pain and request assistance  - Assess pain using appropriate pain scale  - Administer analgesics based on type and severity of pain and evaluate response  - Implement non-pharmacological measures as appropriate and evaluate response  - Consider cultural and social influences on pain and pain management  - Notify physician/advanced practitioner if interventions unsuccessful or patient reports new pain  Outcome: Progressing     Problem: INFECTION - ADULT  Goal: Absence or prevention of progression during hospitalization  Description: INTERVENTIONS:  - Assess and monitor for signs and symptoms of infection  - Monitor lab/diagnostic results  - Monitor all insertion sites, i e  indwelling lines, tubes, and drains  - Monitor endotracheal if appropriate and nasal secretions for changes in amount and color  - [place] appropriate cooling/warming therapies per order  - Administer medications as ordered  - Instruct and encourage patient and family to use good hand hygiene technique  - Identify and instruct in appropriate isolation precautions for identified infection/condition  Outcome: Progressing     Problem: SAFETY ADULT  Goal: Patient will remain free of falls  Description: INTERVENTIONS:  - Assess patient frequently for physical needs  -  Identify cognitive and physical deficits and behaviors that affect risk of falls    -  [place] fall precautions as indicated by assessment   - Educate patient/family on patient safety including physical limitations  - Instruct patient to call for assistance with activity based on assessment  - Modify environment to reduce risk of injury  - Consider OT/PT consult to assist with strengthening/mobility  Outcome: Progressing  Goal: Maintain or return to baseline ADL function  Description: INTERVENTIONS:  -  Assess patient's ability to carry out ADLs; assess patient's baseline for ADL function and identify physical deficits which impact ability to perform ADLs (bathing, care of mouth/teeth, toileting, grooming, dressing, etc )  - Assess/evaluate cause of self-care deficits   - Assess range of motion  - Assess patient's mobility; develop plan if impaired  - Assess patient's need for assistive devices and provide as appropriate  - Encourage maximum independence but intervene and supervise when necessary  - Involve family in performance of ADLs  - Assess for home care needs following discharge   - Consider OT consult to assist with ADL evaluation and planning for discharge  - Provide patient education as appropriate  Outcome: Progressing  Goal: Maintain or return mobility status to optimal level  Description: INTERVENTIONS:  - Assess patient's baseline mobility status (ambulation, transfers, stairs, etc )    - Identify cognitive and physical deficits and behaviors that affect mobility  - Identify mobility aids required to assist with transfers and/or ambulation (gait belt, sit-to-stand, lift, walker, cane, etc )  - [place] fall precautions as indicated by assessment  - Record patient progress and toleration of activity level on Mobility SBAR; progress patient to next Phase/Stage  - Instruct patient to call for assistance with activity based on assessment  - Consider rehabilitation consult to assist with strengthening/weightbearing, etc   Outcome: Progressing

## 2021-02-22 NOTE — ASSESSMENT & PLAN NOTE
-creatinine 1 4 on admission  Baseline creatinine is 1 1    Patient has returned to baseline with IV fluid hydration   -this is likely related to reduced oral intake secondary to dysphagia  -continue to encourage oral intake

## 2021-02-22 NOTE — TRANSPORTATION MEDICAL NECESSITY
Section I - General Information    Name of Patient: Saundra Kuhn                 : 1927    Medicare #: [id number]  Transport Date: 21 (PCS is valid for round trips on this date and for all repetitive trips in the 60-day range as noted below )  Origin: [facility]                                                         Destination: [address]  Is the pt's stay covered under Medicare Part A (PPS/DRG)   []     Closest appropriate facility? If no, why is transport to more distant facility required? Yes  If hospice pt, is this transport related to pt's terminal illness? No       Section II - Medical Necessity Questionnaire  Ambulance transportation is medically necessary only if other means of transport are contraindicated or would be potentially harmful to the patient  To meet this requirement, the patient must either be "bed confined" or suffer from a condition such that transport by means other than ambulance is contraindicated by the patient's condition  The following questions must be answered by the medical professional signing below for this form to be valid:    1)  Describe the MEDICAL CONDITION (physical and/or mental) of this patient AT [address] [place] [address] that requires the patient to be transported in an ambulance and why transport by other means is contraindicated by the patient's condition: falls, T12 fx, sacral wound, Postural kyphosis of cervicothoracic region, Subdural hygroma    2) Is the patient "bed confined" as defined below? No  To be "be confined" the patient must satisfy all three of the following conditions: (1) unable to get up from bed without Assistance; AND (2) unable to ambulate; AND (3) unable to sit in a chair or wheelchair  3) Can this patient safely be transported by car or wheelchair van (i e , seated during transport without a medical attendant or monitoring)?    No    4) In addition to completing questions 1-3 above, please check any of the following conditions that apply*:   *Note: supporting documentation for any boxes checked must be maintained in the patient's medical records  If hosp-hosp transfer, describe services needed at 2nd facility not available at 1st facility? Non-Healed Fractures  Patient is confused  Moderate/severe pain on movement   Unable to tolerate seated position for time needed to transport   Orthopedic device (backboard, halo, pins, traction, brace, wedge, etc,) requiring special handling during transport       Section III - Signature of Physician or Healthcare Professional  I certify that the above information is true and correct based on my evaluation of this patient, and represent that the patient requires transport by ambulance and that other forms of transport are contraindicated  I understand that this information will be used by the Centers for Medicare and Medicaid Services (CMS) to support the determination of medical necessity for ambulance services, and I represent that I have personal knowledge of the patient's condition at time of transport  []  If this box is checked, I also certify that the patient is physically or mentally incapable of signing the ambulance service's claim and that the institution with which I am affiliated has furnished care, services, or assistance to the patient  My signature below is made on behalf of the patient pursuant to 42 CFR §424 36(b)(4)   In accordance with 42 CFR §424 37, the specific reason(s) that the patient is physically or mentally incapable of signing the claim form is as follows:      Signature of Physician* or Healthcare Professional______________________________________________________________  Signature Date 02/22/21 (For scheduled repetitive transports, this form is not valid for transports performed more than 60 days after this date)    Printed Name & Credentials of Physician or Healthcare Professional (MD, DO, RN, etc )___Kirk Patton LSW _____________________________  *Form must be signed by patient's attending physician for scheduled, repetitive transports   For non-repetitive, unscheduled ambulance transports, if unable to obtain the signature of the attending physician, any of the following may sign (choose appropriate option below)  [] Physician Assistant []  Clinical Nurse Specialist []  Registered Nurse  []  Nurse Practitioner  [x] Discharge Planner

## 2021-02-22 NOTE — ASSESSMENT & PLAN NOTE
Acute T2 SEP compression fracture, chronic fractures of T1, T3, T4   · S/p fall at home on 2/17, presented to hospital as trauma on 2/20  On Eliquis for Afib  · Severe kyphosis at baseline  · Ambulates at home with walker  · GCS 14 (confusion, very [place])  Moving all extremities  No weakness  Imaging:  · Xray thoracic spine, 2/21/21: Multiple age-indeterminate thoracic compression deformities with resultant hyperkyphosis  No acute-appearing subluxation  Plan:  · Frequent neuro checks  · Unlikely to tolerate any bracing  · Xrays reviewed - no worsening compression/kyphosis  · Mobilize with PT/OT  · Pain control per primary team  · DVT ppx: SCDs, HSC  Neurosurgery will sign off at this time  Patient does not need any outpatient follow up or additional imaging for T2 fracture as he is not getting braced  Please call with any questions or concerns

## 2021-02-22 NOTE — ASSESSMENT & PLAN NOTE
-sustaining the below stated injuries  -patient has been falling more frequently over the last month, had a recent fall with rib fractures and was admitted to [facility] and discharged to rehab following admission  -PT and OT evaluated the patient recommend discharge back to assisted living facility with increased support which they can provide   D/C back to [facility] today    -geriatrics evaluation appreciated

## 2021-02-22 NOTE — PROGRESS NOTES
Progress Note - Butch Doll 6/30/1927, 80 y o  male MRN: [id number]    Unit/Bed#: [facility] 623-01 Encounter: [id number]    Primary Care Provider: Kermit Aragon MD   Date and time admitted to hospital: 2/20/2021  3:06 PM        [address]  -sustaining the below stated injuries  -patient has been falling more frequently over the last month, had a recent fall with rib fractures and was admitted to [facility] and discharged to rehab following admission  -PT and OT evaluated the patient recommend discharge back to assisted living facility with increased support which they can provide  -geriatrics evaluation appreciated    * Closed T2 fracture [facility])  Assessment & Plan  -Compression fx s/p fall  -appreciate neurosurgery evaluation and recommendations  No plans for bracing secondary to severe kyphosis  -follow-up upright thoraco lumbar spine x-rays  -patient is neuro intact  -PT and OT evaluated the patient recommending discharge back to assisted living with increased support  I spoke with Kelin Jagdeep where he resides who confirm he has required increased support with all mobility and feeds since he was discharged from only rehab about a week ago after an admission to [facility] for rib fractures  Postural kyphosis of cervicothoracic region  Assessment & Plan  -chronic    Subdural hygroma  Assessment & Plan  -Chronic, present on arrival  -appreciate neurosurgery recommendations, no follow-up needed  -continue subcutaneous heparin for DVT prophylaxis    Acute kidney injury ([facility] [address] )  Assessment & Plan  -creatinine 1 4 on admission  Baseline creatinine is 1 1  Patient has returned to baseline with IV fluid hydration   -this is likely related to reduced oral intake secondary to dysphagia  -continue to encourage oral intake    Anemia  Assessment & Plan  -may be related to dilution as patient has no signs of bleeding clinically    Has a dominant oral exam is benign and he is nontender  Chest x-ray was negative on admission  -H/H stable this morning  -this is asymptomatic at this time    Sacral wound  Assessment & Plan  -present on admission  -wound care is following    Dysphagia  Assessment & Plan  -continue pureed diet with honey thickened liquids  Meds crushed in puree  Atrial fibrillation ([address] )  Assessment & Plan  -rate is controlled on home metoprolol  -home Eliquis is held  DVT prophylaxis started today with subcutaneous heparin   -I spoke with the patient's family regarding risks and benefits of anticoagulation given the patient's age and fall risk in the setting of his recent decline  Will hold Eliquis for now and recommend f/u with PCP and cardiologist            Disposition: continue med-surg status, placement pending      SUBJECTIVE:  Chief Complaint: "My back hurts"    Subjective: Patient states that the pain meds do help  He is eating well  He did sleep last night  He would like to go home today         OBJECTIVE:     Meds/Allergies     Current Facility-Administered Medications:     acetaminophen (TYLENOL) tablet 975 mg, 975 mg, Oral, Q8H [address], Tracy Jara PA-C, 975 mg at 02/22/21 0516    ALPRAZolam (XANAX) tablet 0 5 mg, 0 5 mg, Oral, HS PRN, Katy Walters PA-C    atorvastatin (LIPITOR) tablet 10 mg, 10 mg, Oral, HS, Hayde Walsh MD, 10 mg at 02/21/21 2113    cholecalciferol (VITAMIN D3) tablet 1,000 Units, 1,000 Units, Oral, Daily, Tracy Jara PA-C, 1,000 Units at 02/22/21 6498    eplerenone (INSPRA) tablet 25 mg, 25 mg, Oral, Daily, Hayde Walsh MD, 25 mg at 02/22/21 4563    ferrous sulfate tablet 325 mg, 325 mg, Oral, Daily With Breakfast, Tracy Jara PA-C, 325 mg at 02/22/21 9140    furosemide (LASIX) tablet 40 mg, 40 mg, Oral, Daily, Hayde Walsh MD, 40 mg at 02/22/21 0950    gabapentin (NEURONTIN) capsule 100 mg, 100 mg, Oral, HS, Tracy Jara PA-C, 100 mg at 02/21/21 2113    heparin (porcine) subcutaneous injection 5,000 Units, 5,000 Units, Subcutaneous, Q8H [address], Tracy Jara PA-C, 5,000 Units at 02/22/21 0515    levothyroxine tablet 75 mcg, 75 mcg, Oral, Early Morning, Yamini Rivero MD, 75 mcg at 02/22/21 0516    lidocaine (LIDODERM) 5 % patch 2 patch, 2 patch, Topical, Daily, Yamini Rivero MD, 2 patch at 02/21/21 1735    metoprolol succinate (TOPROL-XL) 24 hr tablet 25 mg, 25 mg, Oral, HS, Yamini Rivero MD    oxyCODONE (ROXICODONE) IR tablet 2 5 mg, 2 5 mg, Oral, Q4H PRN, Yamini Rivero MD    oxyCODONE (ROXICODONE) IR tablet 5 mg, 5 mg, Oral, Q4H PRN, Yamini Rivero MD, 5 mg at 02/22/21 0950    pantoprazole (PROTONIX) EC tablet 40 mg, 40 mg, Oral, Daily, Yamini Rivero MD, 40 mg at 02/22/21 8340    sertraline (ZOLOFT) tablet 25 mg, 25 mg, Oral, Daily, Tracy Jara PA-C, 25 mg at 02/22/21 1209    tamsulosin (FLOMAX) capsule 0 4 mg, 0 4 mg, Oral, Daily With Christopher Murphy MD, 0 4 mg at 02/21/21 1734     Vitals:   Vitals:    02/22/21 0953   BP: 133/66   Pulse:    Resp:    Temp:    SpO2:        Intake/Output:  I/O       02/20 0701 - 02/21 0700 02/21 0701 - 02/22 0700 02/22 0701 - 02/23 0700    P  O   440 200    I V  (mL/kg) 937 5 (18 8)      Total Intake(mL/kg) 937 5 (18 8) 440 (8 8) 200 (4)    Urine (mL/kg/hr)  0 (0) 275 (1 1)    Total Output  0 275    Net +937 5 +440 -75           Unmeasured Urine Occurrence 1 x 2 x     Unmeasured Stool Occurrence  1 x            Nutrition/GI Proph/Bowel Reg: Regular    Physical Exam:   GENERAL APPEARANCE: NAD  NEURO: GCS 14 (4, 4, 6), non-focal  HEENT: NCAT  CV: RRR, no MGR  LUNGS: CTA bilaterally  GI: soft,non-tender, non-focal  : voiding  MSK: moving all equally  SKIN: pink, warm,dry    Invasive Devices     Peripheral Intravenous Line            Peripheral IV 02/20/21 Distal;Left;Upper;Ventral (anterior) Arm 1 day          Drain            External Urinary Catheter Small less than 1 day                 Lab Results:   Results: I have personally reviewed pertinent reports   , BMP/CMP:   Lab Results   Component Value Date    SODIUM 145 02/22/2021    K 3 8 02/22/2021     02/22/2021    CO2 32 02/22/2021    BUN 30 (H) 02/22/2021    CREATININE 1 26 02/22/2021    CALCIUM 8 5 02/22/2021    EGFR 49 02/22/2021    and CBC:   Lab Results   Component Value Date    WBC 3 69 (L) 02/22/2021    HGB 11 8 (L) 02/22/2021    HCT 37 8 02/22/2021     (H) 02/22/2021    PLT 63 (L) 02/22/2021    MCH 33 8 02/22/2021    MCHC 31 2 (L) 02/22/2021    RDW 15 7 (H) 02/22/2021    MPV 10 3 02/22/2021    NRBC 0 02/22/2021     Imaging/EKG Studies: Results: I have personally reviewed pertinent reports      Other Studies: no new  VTE Prophylaxis: Sequential compression device (Venodyne)  and Heparin

## 2021-02-22 NOTE — ASSESSMENT & PLAN NOTE
-rate is controlled on home metoprolol  -home Eliquis is held  DVT prophylaxis started today with subcutaneous heparin   -I spoke with the patient's family regarding risks and benefits of anticoagulation given the patient's age and fall risk in the setting of his recent decline   Will hold Eliquis for now, on discharge, and recommend f/u with PCP and cardiologist

## 2021-02-22 NOTE — ASSESSMENT & PLAN NOTE
S/p fall at home on Eliquis on 2/17  Risk versus benefit discussion regarding Eliquis in this 80year old patient with frequent falls  No repeat imaging or follow up needed  Imaging:  · CT head 2/21/2021: Limited examination due to patient positioning  2   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  No acute intracranial abnormality  3   Chronic appearing bilateral subdural hygromas, left side larger than right

## 2021-02-22 NOTE — MALNUTRITION/BMI
This medical record reflects one or more clinical indicators suggestive of malnutrition and/or morbid obesity  Malnutrition Findings:   Adult Malnutrition type: Chronic illness     Malnutrition Characteristics: Muscle loss, Inadequate energy(evidenced by poor po intake, <75% energy needs compared to estimated needs for > 1 mo, muscle mass loss/hollow temples, protruding clavicles treated with diet and supplements)    BMI Findings:  Adult BMI Classifications: Underweight < 18 5     Body mass index is 17 75 kg/m²  See Nutrition note dated  2/21/21 for additional details  Completed nutrition assessment is viewable in the nutrition documentation

## (undated) DEVICE — SYRINGE 50ML LL

## (undated) DEVICE — BRUSH ENDO CLEANING DBL-HEADER

## (undated) DEVICE — BITE BLOCK ADULT 11FR OMNI BLOC

## (undated) DEVICE — SINGLE-USE POLYPECTOMY SNARE: Brand: SENSATION SHORT THROW

## (undated) DEVICE — 1200CC GUARDIAN II: Brand: GUARDIAN

## (undated) DEVICE — SYRINGE 30ML LL

## (undated) DEVICE — TUBING SUCTION 5MM X 12 FT